# Patient Record
Sex: FEMALE | Race: WHITE
[De-identification: names, ages, dates, MRNs, and addresses within clinical notes are randomized per-mention and may not be internally consistent; named-entity substitution may affect disease eponyms.]

---

## 2020-08-25 ENCOUNTER — HOSPITAL ENCOUNTER (OUTPATIENT)
Dept: HOSPITAL 8 - CFH | Age: 75
Discharge: HOME | End: 2020-08-25
Attending: INTERNAL MEDICINE
Payer: MEDICARE

## 2020-08-25 DIAGNOSIS — E78.5: ICD-10-CM

## 2020-08-25 DIAGNOSIS — I73.9: ICD-10-CM

## 2020-08-25 DIAGNOSIS — I10: ICD-10-CM

## 2020-08-25 DIAGNOSIS — I21.29: ICD-10-CM

## 2020-08-25 DIAGNOSIS — I25.89: Primary | ICD-10-CM

## 2020-08-25 PROCEDURE — A9502 TC99M TETROFOSMIN: HCPCS

## 2020-08-25 PROCEDURE — 78452 HT MUSCLE IMAGE SPECT MULT: CPT

## 2020-08-25 PROCEDURE — 93017 CV STRESS TEST TRACING ONLY: CPT

## 2020-11-30 ENCOUNTER — HOSPITAL ENCOUNTER (OUTPATIENT)
Dept: HOSPITAL 8 - CACL | Age: 75
Discharge: HOME | End: 2020-11-30
Attending: INTERNAL MEDICINE
Payer: MEDICARE

## 2020-11-30 VITALS — SYSTOLIC BLOOD PRESSURE: 204 MMHG | DIASTOLIC BLOOD PRESSURE: 83 MMHG

## 2020-11-30 VITALS — BODY MASS INDEX: 23.79 KG/M2 | HEIGHT: 64 IN | WEIGHT: 139.33 LBS

## 2020-11-30 DIAGNOSIS — R94.39: Primary | ICD-10-CM

## 2020-11-30 DIAGNOSIS — Z95.5: ICD-10-CM

## 2020-11-30 DIAGNOSIS — I73.9: ICD-10-CM

## 2020-11-30 DIAGNOSIS — E66.9: ICD-10-CM

## 2020-11-30 DIAGNOSIS — Z82.49: ICD-10-CM

## 2020-11-30 DIAGNOSIS — Z79.890: ICD-10-CM

## 2020-11-30 DIAGNOSIS — I49.3: ICD-10-CM

## 2020-11-30 DIAGNOSIS — G47.33: ICD-10-CM

## 2020-11-30 DIAGNOSIS — E03.9: ICD-10-CM

## 2020-11-30 DIAGNOSIS — I25.84: ICD-10-CM

## 2020-11-30 DIAGNOSIS — E78.5: ICD-10-CM

## 2020-11-30 DIAGNOSIS — I25.118: ICD-10-CM

## 2020-11-30 DIAGNOSIS — I34.0: ICD-10-CM

## 2020-11-30 DIAGNOSIS — Z88.7: ICD-10-CM

## 2020-11-30 DIAGNOSIS — Z79.899: ICD-10-CM

## 2020-11-30 DIAGNOSIS — I10: ICD-10-CM

## 2020-11-30 DIAGNOSIS — I25.83: ICD-10-CM

## 2020-11-30 DIAGNOSIS — K21.9: ICD-10-CM

## 2020-11-30 DIAGNOSIS — Z87.891: ICD-10-CM

## 2020-11-30 LAB
ANION GAP SERPL CALC-SCNC: 8 MMOL/L (ref 5–15)
BASOPHILS # BLD AUTO: 0 X10^3/UL (ref 0–0.1)
BASOPHILS NFR BLD AUTO: 1 % (ref 0–1)
CALCIUM SERPL-MCNC: 9.2 MG/DL (ref 8.5–10.1)
CHLORIDE SERPL-SCNC: 104 MMOL/L (ref 98–107)
CREAT SERPL-MCNC: 0.82 MG/DL (ref 0.55–1.02)
EOSINOPHIL # BLD AUTO: 0.1 X10^3/UL (ref 0–0.4)
EOSINOPHIL NFR BLD AUTO: 2 % (ref 1–7)
ERYTHROCYTE [DISTWIDTH] IN BLOOD BY AUTOMATED COUNT: 13.9 % (ref 9.6–15.2)
LYMPHOCYTES # BLD AUTO: 1 X10^3/UL (ref 1–3.4)
LYMPHOCYTES NFR BLD AUTO: 23 % (ref 22–44)
MCH RBC QN AUTO: 28.6 PG (ref 27–34.8)
MCHC RBC AUTO-ENTMCNC: 33.4 G/DL (ref 32.4–35.8)
MD: NO
MONOCYTES # BLD AUTO: 0.5 X10^3/UL (ref 0.2–0.8)
MONOCYTES NFR BLD AUTO: 11 % (ref 2–9)
NEUTROPHILS # BLD AUTO: 2.8 X10^3/UL (ref 1.8–6.8)
NEUTROPHILS NFR BLD AUTO: 63 % (ref 42–75)
PLATELET # BLD AUTO: 215 X10^3/UL (ref 130–400)
PMV BLD AUTO: 8.7 FL (ref 7.4–10.4)
RBC # BLD AUTO: 4.35 X10^6/UL (ref 3.82–5.3)

## 2020-11-30 PROCEDURE — 85025 COMPLETE CBC W/AUTO DIFF WBC: CPT

## 2020-11-30 PROCEDURE — 99157 MOD SED OTHER PHYS/QHP EA: CPT

## 2020-11-30 PROCEDURE — 80048 BASIC METABOLIC PNL TOTAL CA: CPT

## 2020-11-30 PROCEDURE — 36415 COLL VENOUS BLD VENIPUNCTURE: CPT

## 2020-11-30 PROCEDURE — 75630 X-RAY AORTA LEG ARTERIES: CPT

## 2020-11-30 PROCEDURE — C1760 CLOSURE DEV, VASC: HCPCS

## 2020-11-30 PROCEDURE — 99156 MOD SED OTH PHYS/QHP 5/>YRS: CPT

## 2020-11-30 PROCEDURE — 75625 CONTRAST EXAM ABDOMINL AORTA: CPT

## 2020-11-30 PROCEDURE — 93458 L HRT ARTERY/VENTRICLE ANGIO: CPT

## 2020-11-30 PROCEDURE — C1894 INTRO/SHEATH, NON-LASER: HCPCS

## 2020-11-30 PROCEDURE — C1769 GUIDE WIRE: HCPCS

## 2021-01-14 DIAGNOSIS — Z23 NEED FOR VACCINATION: ICD-10-CM

## 2022-03-20 ENCOUNTER — OFFICE VISIT (OUTPATIENT)
Dept: URGENT CARE | Facility: PHYSICIAN GROUP | Age: 77
End: 2022-03-20
Payer: MEDICARE

## 2022-03-20 ENCOUNTER — HOSPITAL ENCOUNTER (OUTPATIENT)
Dept: RADIOLOGY | Facility: MEDICAL CENTER | Age: 77
End: 2022-03-20
Attending: PHYSICIAN ASSISTANT
Payer: MEDICARE

## 2022-03-20 VITALS
SYSTOLIC BLOOD PRESSURE: 110 MMHG | OXYGEN SATURATION: 95 % | RESPIRATION RATE: 16 BRPM | BODY MASS INDEX: 26.29 KG/M2 | TEMPERATURE: 98.7 F | HEIGHT: 64 IN | DIASTOLIC BLOOD PRESSURE: 78 MMHG | WEIGHT: 154 LBS | HEART RATE: 65 BPM

## 2022-03-20 DIAGNOSIS — S39.012A ACUTE MYOFASCIAL STRAIN OF LUMBAR REGION, INITIAL ENCOUNTER: ICD-10-CM

## 2022-03-20 DIAGNOSIS — M54.50 ACUTE BILATERAL LOW BACK PAIN WITHOUT SCIATICA: ICD-10-CM

## 2022-03-20 PROCEDURE — 99204 OFFICE O/P NEW MOD 45 MIN: CPT | Performed by: PHYSICIAN ASSISTANT

## 2022-03-20 PROCEDURE — 72100 X-RAY EXAM L-S SPINE 2/3 VWS: CPT

## 2022-03-20 ASSESSMENT — PAIN SCALES - GENERAL: PAINLEVEL: 6=MODERATE PAIN

## 2022-03-20 NOTE — PROGRESS NOTES
"Subjective:   Debbie Joseph is a 76 y.o. female who presents for Fall (Fell back, hit left side of head, lower back pain. )      HPI  Patient is a 76-year-old female who presents to clinic with complaints of lower back pain onset 3 days ago.  She states she accidentally misplaced a step when she was stepping backwards and fell to her lower back and her back of her head struck the side of the house.  She did not hit her head very hard.  It was caught on videotape and she denies any loss of consciousness.  Denies any lingering headache, nausea, vomiting, numbness, tingling.  She complains of bilateral lower back pain worse with movements and turning over in bed.  Denies any numbness, tingling down her legs.  Denies any radiation pain down her legs.  Denies any saddle anesthesia, loss of bowel or bladder control.        Medications:    • atenolol Tabs  • hydroCHLOROthiazide Tabs  • levothyroxine Tabs  • losartan Tabs  • Risedronate Sodium Tabs  • simvastatin Tabs    Allergies: Tetanus antitoxin    Problem List: Debbie Joseph does not have a problem list on file.    Surgical History:  No past surgical history on file.    Past Social Hx: Debbie Joseph  reports that she has never smoked. She does not have any smokeless tobacco history on file. She reports that she does not drink alcohol.     Past Family Hx:  Debbie Joseph family history is not on file.     Problem list, medications, and allergies reviewed by myself today in Epic.     Objective:     /78 (BP Location: Right arm, Patient Position: Sitting, BP Cuff Size: Adult)   Pulse 65   Temp 37.1 °C (98.7 °F) (Temporal)   Resp 16   Ht 1.626 m (5' 4\")   Wt 69.9 kg (154 lb)   SpO2 95%   BMI 26.43 kg/m²     Physical Exam  Vitals reviewed.   Constitutional:       General: She is not in acute distress.     Appearance: Normal appearance. She is not ill-appearing or toxic-appearing.   HENT:      Head: Normocephalic and atraumatic.      Right Ear: Tympanic membrane normal.      Left " Ear: Tympanic membrane normal.      Mouth/Throat:      Mouth: Mucous membranes are moist.      Pharynx: Oropharynx is clear.   Eyes:      Extraocular Movements: Extraocular movements intact.      Conjunctiva/sclera: Conjunctivae normal.      Pupils: Pupils are equal, round, and reactive to light.   Cardiovascular:      Rate and Rhythm: Normal rate and regular rhythm.      Heart sounds: Normal heart sounds.   Pulmonary:      Effort: Pulmonary effort is normal. No respiratory distress.      Breath sounds: Normal breath sounds. No wheezing, rhonchi or rales.   Musculoskeletal:      Cervical back: Neck supple.      Comments: Back: Full range of flexion, extension, rotation, lateralization.   Diffuse enderness to palpation lumbar area with mild midline tenderness.   Negative crepitus, deformities, or step-offs.  Negative straight leg raise     Lymphadenopathy:      Cervical: No cervical adenopathy.   Skin:     General: Skin is warm and dry.   Neurological:      General: No focal deficit present.      Mental Status: She is alert and oriented to person, place, and time.      Gait: Gait is intact.      Comments: Strength 5/5 bilateral lower extremities   Psychiatric:         Mood and Affect: Mood normal.         Behavior: Behavior normal.       RADIOLOGY RESULTS   DX-LUMBAR SPINE-2 OR 3 VIEWS    Result Date: 3/20/2022  3/20/2022 2:13 PM HISTORY/REASON FOR EXAM:  Low back pain after ground-level fall TECHNIQUE/ EXAM DESCRIPTION AND NUMBER OF VIEWS:  3 views of the lumbar spine. COMPARISON: None. FINDINGS: Vertebral body height is well maintained.  There is no evidence of fracture. Vertebral alignment is normal. There is moderate degenerative disc disease and facet arthropathy. There is diffuse osteopenia.     No compression deformity or acute fracture is identified. THERE IS DEGENERATIVE DISC DISEASE AND FACET ARTHROPATHY.         Diagnosis and associated orders:     1. Acute myofascial strain of lumbar region, initial  encounter  - DX-LUMBAR SPINE-2 OR 3 VIEWS; Future  - diclofenac sodium (VOLTAREN) 1 % Gel; Apply 2 grams to affected area no more than four times per day  Dispense: 100 g; Refill: 0       Comments/MDM:     • X-ray results per radiologist interpretation above. I personally reviewed images and radiologist report which showed no compression deformity or acute fracture identified.   Discussed with patient signs and symptoms consistent with a back strain and contusion.   Treatment of initial rest with no heavy lifting, stooping, or strenuous activity. Massage, ice and/or heat which ever feels better, and Ibuprofen per manufacture's instructions. Encouraged walking, stretching, and range of motion exercises as tolerated. Avoid sitting or laying down for long periods of time except for at night during sleep.   Patient is overall very well-appearing, no acute distress, and normal vital signs. Suspicions for acute emergent pathology are low.   Follow up with your PCP or return to urgent care if symptoms not improving in 1-2 weeks.        I personally reviewed prior external notes and test results pertinent to today's visit. Red flags discussed and indications to present to the Emergency Department. Supportive care, natural history, differential diagnoses, and indications for immediate follow-up discussed. Patient expresses understanding and agrees to plan. Patient denies any other questions or concerns.     Follow-up with the primary care physician for recheck, reevaluation, and consideration of further management.    Please note that this dictation was created using voice recognition software. I have made a reasonable attempt to correct obvious errors, but I expect that there are errors of grammar and possibly content that I did not discover before finalizing the note.    This note was electronically signed by Arvind Fallon PA-C

## 2022-11-03 ENCOUNTER — PATIENT MESSAGE (OUTPATIENT)
Dept: HEALTH INFORMATION MANAGEMENT | Facility: OTHER | Age: 77
End: 2022-11-03

## 2023-11-29 ENCOUNTER — PATIENT MESSAGE (OUTPATIENT)
Dept: HEALTH INFORMATION MANAGEMENT | Facility: OTHER | Age: 78
End: 2023-11-29

## 2024-12-19 ENCOUNTER — HOSPITAL ENCOUNTER (EMERGENCY)
Facility: MEDICAL CENTER | Age: 79
End: 2024-12-19
Attending: EMERGENCY MEDICINE
Payer: MEDICARE

## 2024-12-19 ENCOUNTER — APPOINTMENT (OUTPATIENT)
Dept: RADIOLOGY | Facility: MEDICAL CENTER | Age: 79
End: 2024-12-19
Attending: EMERGENCY MEDICINE
Payer: MEDICARE

## 2024-12-19 VITALS
TEMPERATURE: 97.3 F | WEIGHT: 167 LBS | OXYGEN SATURATION: 95 % | RESPIRATION RATE: 17 BRPM | BODY MASS INDEX: 28.51 KG/M2 | HEART RATE: 75 BPM | SYSTOLIC BLOOD PRESSURE: 185 MMHG | HEIGHT: 64 IN | DIASTOLIC BLOOD PRESSURE: 78 MMHG

## 2024-12-19 VITALS
RESPIRATION RATE: 18 BRPM | WEIGHT: 167 LBS | SYSTOLIC BLOOD PRESSURE: 153 MMHG | BODY MASS INDEX: 28.51 KG/M2 | DIASTOLIC BLOOD PRESSURE: 92 MMHG | HEART RATE: 82 BPM | OXYGEN SATURATION: 88 % | TEMPERATURE: 98.6 F | HEIGHT: 64 IN

## 2024-12-19 DIAGNOSIS — S52.92XA CLOSED FRACTURE OF LEFT FOREARM, INITIAL ENCOUNTER: ICD-10-CM

## 2024-12-19 DIAGNOSIS — S52.502A CLOSED FRACTURE OF DISTAL END OF LEFT RADIUS, UNSPECIFIED FRACTURE MORPHOLOGY, INITIAL ENCOUNTER: ICD-10-CM

## 2024-12-19 DIAGNOSIS — R53.1 WEAKNESS: ICD-10-CM
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
## 2024-12-19 LAB
ALBUMIN SERPL BCP-MCNC: 4.1 G/DL (ref 3.2–4.9)
ALBUMIN/GLOB SERPL: 1.5 G/DL
ALP SERPL-CCNC: 95 U/L (ref 30–99)
ALT SERPL-CCNC: 18 U/L (ref 2–50)
ANION GAP SERPL CALC-SCNC: 9 MMOL/L (ref 7–16)
APPEARANCE UR: CLEAR
AST SERPL-CCNC: 32 U/L (ref 12–45)
BACTERIA #/AREA URNS HPF: NORMAL /HPF
BASOPHILS # BLD AUTO: 0.4 % (ref 0–1.8)
BASOPHILS # BLD: 0.04 K/UL (ref 0–0.12)
BILIRUB SERPL-MCNC: 0.4 MG/DL (ref 0.1–1.5)
BILIRUB UR QL STRIP.AUTO: NEGATIVE
BUN SERPL-MCNC: 30 MG/DL (ref 8–22)
CALCIUM ALBUM COR SERPL-MCNC: 9.3 MG/DL (ref 8.5–10.5)
CALCIUM SERPL-MCNC: 9.4 MG/DL (ref 8.5–10.5)
CASTS URNS QL MICRO: NORMAL /LPF (ref 0–2)
CHLORIDE SERPL-SCNC: 104 MMOL/L (ref 96–112)
CO2 SERPL-SCNC: 23 MMOL/L (ref 20–33)
COLOR UR: YELLOW
CREAT SERPL-MCNC: 0.98 MG/DL (ref 0.5–1.4)
EOSINOPHIL # BLD AUTO: 0.13 K/UL (ref 0–0.51)
EOSINOPHIL NFR BLD: 1.2 % (ref 0–6.9)
EPITHELIAL CELLS 1715: NORMAL /HPF (ref 0–5)
ERYTHROCYTE [DISTWIDTH] IN BLOOD BY AUTOMATED COUNT: 44.2 FL (ref 35.9–50)
GFR SERPLBLD CREATININE-BSD FMLA CKD-EPI: 58 ML/MIN/1.73 M 2
GLOBULIN SER CALC-MCNC: 2.8 G/DL (ref 1.9–3.5)
GLUCOSE SERPL-MCNC: 110 MG/DL (ref 65–99)
GLUCOSE UR STRIP.AUTO-MCNC: NEGATIVE MG/DL
HCT VFR BLD AUTO: 39.6 % (ref 37–47)
HGB BLD-MCNC: 13.4 G/DL (ref 12–16)
HOLDING TUBE BB 8507: NORMAL
IMM GRANULOCYTES # BLD AUTO: 0.09 K/UL (ref 0–0.11)
IMM GRANULOCYTES NFR BLD AUTO: 0.8 % (ref 0–0.9)
KETONES UR STRIP.AUTO-MCNC: NEGATIVE MG/DL
LEUKOCYTE ESTERASE UR QL STRIP.AUTO: NEGATIVE
LYMPHOCYTES # BLD AUTO: 1.08 K/UL (ref 1–4.8)
LYMPHOCYTES NFR BLD: 9.8 % (ref 22–41)
MCH RBC QN AUTO: 29.5 PG (ref 27–33)
MCHC RBC AUTO-ENTMCNC: 33.8 G/DL (ref 32.2–35.5)
MCV RBC AUTO: 87.2 FL (ref 81.4–97.8)
MICRO URNS: NORMAL
MONOCYTES # BLD AUTO: 0.78 K/UL (ref 0–0.85)
MONOCYTES NFR BLD AUTO: 7.1 % (ref 0–13.4)
NEUTROPHILS # BLD AUTO: 8.85 K/UL (ref 1.82–7.42)
NEUTROPHILS NFR BLD: 80.7 % (ref 44–72)
NITRITE UR QL STRIP.AUTO: NEGATIVE
NRBC # BLD AUTO: 0 K/UL
NRBC BLD-RTO: 0 /100 WBC (ref 0–0.2)
PH UR STRIP.AUTO: 5.5 [PH] (ref 5–8)
PLATELET # BLD AUTO: 222 K/UL (ref 164–446)
PMV BLD AUTO: 10.9 FL (ref 9–12.9)
POTASSIUM SERPL-SCNC: 5 MMOL/L (ref 3.6–5.5)
PROT SERPL-MCNC: 6.9 G/DL (ref 6–8.2)
PROT UR QL STRIP: NEGATIVE MG/DL
RBC # BLD AUTO: 4.54 M/UL (ref 4.2–5.4)
RBC # URNS HPF: NORMAL /HPF (ref 0–2)
RBC UR QL AUTO: NEGATIVE
SODIUM SERPL-SCNC: 136 MMOL/L (ref 135–145)
SP GR UR STRIP.AUTO: 1.01
UROBILINOGEN UR STRIP.AUTO-MCNC: 0.2 EU/DL
WBC # BLD AUTO: 11 K/UL (ref 4.8–10.8)
WBC #/AREA URNS HPF: NORMAL /HPF

## 2024-12-19 PROCEDURE — 99284 EMERGENCY DEPT VISIT MOD MDM: CPT

## 2024-12-19 PROCEDURE — 80053 COMPREHEN METABOLIC PANEL: CPT

## 2024-12-19 PROCEDURE — 72128 CT CHEST SPINE W/O DYE: CPT

## 2024-12-19 PROCEDURE — 97535 SELF CARE MNGMENT TRAINING: CPT

## 2024-12-19 PROCEDURE — 99284 EMERGENCY DEPT VISIT MOD MDM: CPT | Mod: 27

## 2024-12-19 PROCEDURE — 36415 COLL VENOUS BLD VENIPUNCTURE: CPT

## 2024-12-19 PROCEDURE — 72192 CT PELVIS W/O DYE: CPT

## 2024-12-19 PROCEDURE — 70486 CT MAXILLOFACIAL W/O DYE: CPT

## 2024-12-19 PROCEDURE — 29125 APPL SHORT ARM SPLINT STATIC: CPT

## 2024-12-19 PROCEDURE — 97163 PT EVAL HIGH COMPLEX 45 MIN: CPT

## 2024-12-19 PROCEDURE — 70450 CT HEAD/BRAIN W/O DYE: CPT

## 2024-12-19 PROCEDURE — 302874 HCHG BANDAGE ACE 2 OR 3""

## 2024-12-19 PROCEDURE — 72131 CT LUMBAR SPINE W/O DYE: CPT

## 2024-12-19 PROCEDURE — 85025 COMPLETE CBC W/AUTO DIFF WBC: CPT

## 2024-12-19 PROCEDURE — 81001 URINALYSIS AUTO W/SCOPE: CPT

## 2024-12-19 PROCEDURE — 72125 CT NECK SPINE W/O DYE: CPT

## 2024-12-19 PROCEDURE — 73110 X-RAY EXAM OF WRIST: CPT | Mod: LT

## 2024-12-19 ASSESSMENT — COGNITIVE AND FUNCTIONAL STATUS - GENERAL
MOVING FROM LYING ON BACK TO SITTING ON SIDE OF FLAT BED: A LITTLE
STANDING UP FROM CHAIR USING ARMS: A LITTLE
MOBILITY SCORE: 18
TURNING FROM BACK TO SIDE WHILE IN FLAT BAD: A LITTLE
CLIMB 3 TO 5 STEPS WITH RAILING: A LITTLE
SUGGESTED CMS G CODE MODIFIER MOBILITY: CK
WALKING IN HOSPITAL ROOM: A LITTLE
MOVING TO AND FROM BED TO CHAIR: A LITTLE

## 2024-12-19 ASSESSMENT — FIBROSIS 4 INDEX: FIB4 SCORE: 2.75

## 2024-12-19 ASSESSMENT — GAIT ASSESSMENTS
GAIT LEVEL OF ASSIST: STANDBY ASSIST
DEVIATION: INCREASED BASE OF SUPPORT;BRADYKINETIC
DISTANCE (FEET): 50

## 2024-12-19 ASSESSMENT — PAIN DESCRIPTION - PAIN TYPE: TYPE: ACUTE PAIN

## 2024-12-19 NOTE — ED TRIAGE NOTES
Debbie Joseph   79 y.o.     Chief Complaint   Patient presents with    Weakness     Pt JAYSHREE SMYTH from home for new onset of weakness. Pt states bilateral leg weakness.  Pt had a GLF this morning and was seen in the ED. -head CT, pt has a broken wrist. Pt realized when she got home after discharge she felt weak and didn't feel like she could care for herself. Pt lives alone and was struggling to get off the toilet because of the weakness. Denies dizziness or LOC.     Pt is alert, oriented, and follows commands. Pt speaking in full sentences and responds appropriately to questions. No acute distress noted. Respirations are even and unlabored.     Vitals:    12/19/24 1401   BP: (!) 141/65   Pulse: 74   Resp: 16   Temp: 37 °C (98.6 °F)   SpO2: 95%

## 2024-12-19 NOTE — ED PROVIDER NOTES
ED Provider Note    CHIEF COMPLAINT  Chief Complaint   Patient presents with    Weakness     Pt JAYSHREE REM from home for new onset of weakness. Pt states bilateral leg weakness.  Pt had a GLF this morning and was seen in the ED. -head CT, pt has a broken wrist. Pt realized when she got home after discharge she felt weak and didn't feel like she could care for herself. Pt lives alone and was struggling to get off the toilet because of the weakness. Denies dizziness or LOC.       EXTERNAL RECORDS REVIEWED  Recent note from cardiology, patient followed for heart failure, with history of peripheral vascular disease.  Patient with preserved ejection fraction heart failure.  Patient also with ER visit here earlier today, see below for further details regarding this    HPI/ROS      Debbie Joseph is a 79 y.o. female who presents with chief complaint of weakness.  Patient was seen earlier today after mechanical fall.  Patient was trying to get into a moving car and fell landing on her left hand, and striking her head.  Patient was evaluated here with CT head, CT C-spine, CT max face, x-ray of her wrist, CT of her spine and pelvis and basic labs.  Patient's extensive workup revealed some minimal leukocytosis, a chemistry that was unremarkable, a CT head, CT spine, CT pelvis, CT max face all of which failed to reveal any evidence of acute actionable trauma, and an x-ray of her left wrist which revealed left distal radius fracture.  Patient was sent home following this reassuring workup but upon returning home reports that she felt like she was not going to be able to care for herself as she lives alone.  She felt weak trying to get up and out of her chair.  Patient denies any associated numbness.  Patient denies any associated headache.    PAST MEDICAL HISTORY   has a past medical history of Osteoporosis, senile.    SURGICAL HISTORY  patient denies any surgical history    FAMILY HISTORY  History reviewed. No pertinent family  "history.    SOCIAL HISTORY  Social History     Tobacco Use    Smoking status: Never    Smokeless tobacco: Not on file   Vaping Use    Vaping status: Never Used   Substance and Sexual Activity    Alcohol use: No    Drug use: Never    Sexual activity: Not on file       CURRENT MEDICATIONS  Home Medications       Reviewed by Michelle Bloom R.N. (Registered Nurse) on 12/19/24 at 1402  Med List Status: Partial     Medication Last Dose Status   atenolol (TENORMIN) 50 MG TABS  Active   diclofenac sodium (VOLTAREN) 1 % Gel  Active   hydrochlorothiazide (HYDRODIURIL) 25 MG TABS  Active   levothyroxine (SYNTHROID) 125 MCG TABS  Active   losartan (COZAAR) 100 MG TABS  Active   Risedronate Sodium (ACTONEL) 150 MG TABS  Active   simvastatin (ZOCOR) 20 MG TABS  Active                    ALLERGIES  Allergies   Allergen Reactions    Tetanus Antitoxin        PHYSICAL EXAM  VITAL SIGNS: BP (!) 141/65   Pulse 74   Temp 37 °C (98.6 °F) (Temporal)   Resp 16   Ht 1.626 m (5' 4\")   Wt 75.8 kg (167 lb)   SpO2 95%   BMI 28.67 kg/m²    Physical Exam  Constitutional:       Appearance: Normal appearance.   HENT:      Head:      Comments: Some ecchymosis patient's maxilla, no large cephalhematoma.  No skull abnormality.     Mouth/Throat:      Mouth: Mucous membranes are moist.   Cardiovascular:      Rate and Rhythm: Normal rate and regular rhythm.      Heart sounds: No murmur heard.  Pulmonary:      Effort: Pulmonary effort is normal.   Abdominal:      General: Abdomen is flat. There is no distension.      Palpations: There is no mass.      Tenderness: There is no abdominal tenderness.   Musculoskeletal:      Comments: Shoulders, elbows with full range of motion and no pain evoked.  Bilateral hips, knees, ankles without any pain evoked.  5 out of 5 strength in distal and proximal musculature.  No associated clonus.  Sensation intact throughout.  Cervical, thoracic, lumbar spine without any tenderness to palpation.  Distal pulses are 2+ " in bilateral lower extremities.  Sensation intact throughout.   Neurological:      General: No focal deficit present.      Mental Status: She is alert and oriented to person, place, and time.   Psychiatric:         Mood and Affect: Mood normal.           EKG/LABS  Results for orders placed or performed during the hospital encounter of 12/19/24   CBC WITH DIFFERENTIAL    Collection Time: 12/19/24  8:24 AM   Result Value Ref Range    WBC 11.0 (H) 4.8 - 10.8 K/uL    RBC 4.54 4.20 - 5.40 M/uL    Hemoglobin 13.4 12.0 - 16.0 g/dL    Hematocrit 39.6 37.0 - 47.0 %    MCV 87.2 81.4 - 97.8 fL    MCH 29.5 27.0 - 33.0 pg    MCHC 33.8 32.2 - 35.5 g/dL    RDW 44.2 35.9 - 50.0 fL    Platelet Count 222 164 - 446 K/uL    MPV 10.9 9.0 - 12.9 fL    Neutrophils-Polys 80.70 (H) 44.00 - 72.00 %    Lymphocytes 9.80 (L) 22.00 - 41.00 %    Monocytes 7.10 0.00 - 13.40 %    Eosinophils 1.20 0.00 - 6.90 %    Basophils 0.40 0.00 - 1.80 %    Immature Granulocytes 0.80 0.00 - 0.90 %    Nucleated RBC 0.00 0.00 - 0.20 /100 WBC    Neutrophils (Absolute) 8.85 (H) 1.82 - 7.42 K/uL    Lymphs (Absolute) 1.08 1.00 - 4.80 K/uL    Monos (Absolute) 0.78 0.00 - 0.85 K/uL    Eos (Absolute) 0.13 0.00 - 0.51 K/uL    Baso (Absolute) 0.04 0.00 - 0.12 K/uL    Immature Granulocytes (abs) 0.09 0.00 - 0.11 K/uL    NRBC (Absolute) 0.00 K/uL   CMP    Collection Time: 12/19/24  8:24 AM   Result Value Ref Range    Sodium 136 135 - 145 mmol/L    Potassium 5.0 3.6 - 5.5 mmol/L    Chloride 104 96 - 112 mmol/L    Co2 23 20 - 33 mmol/L    Anion Gap 9.0 7.0 - 16.0    Glucose 110 (H) 65 - 99 mg/dL    Bun 30 (H) 8 - 22 mg/dL    Creatinine 0.98 0.50 - 1.40 mg/dL    Calcium 9.4 8.5 - 10.5 mg/dL    Correct Calcium 9.3 8.5 - 10.5 mg/dL    AST(SGOT) 32 12 - 45 U/L    ALT(SGPT) 18 2 - 50 U/L    Alkaline Phosphatase 95 30 - 99 U/L    Total Bilirubin 0.4 0.1 - 1.5 mg/dL    Albumin 4.1 3.2 - 4.9 g/dL    Total Protein 6.9 6.0 - 8.2 g/dL    Globulin 2.8 1.9 - 3.5 g/dL    A-G Ratio 1.5 g/dL    HOLD BLOOD BANK SPECIMEN (NOT TESTED)    Collection Time: 12/19/24  8:24 AM   Result Value Ref Range    Holding Tube - Bb DONE    ESTIMATED GFR    Collection Time: 12/19/24  8:24 AM   Result Value Ref Range    GFR (CKD-EPI) 58 (A) >60 mL/min/1.73 m 2       I have independently interpreted this EKG          COURSE & MEDICAL DECISION MAKING    ASSESSMENT, COURSE AND PLAN  Care Narrative: Well-appearing patient here with extensive workup for recent trauma.  This does not appear to be consistent with focal neurologic changes to suggest delayed bleed.  She has a very reassuring exam otherwise.  She is complaining of weakness but no focal findings on exam.  Patient without any associated back pain.  Patient is anticoagulated but given her lack of any focal findings on exam my suspicion of epidural hematoma as a cause of symptoms is very low here.  No clonus.  PT orders placed, will discuss with social work.  Social work will review the case with physical therapy.  I have placed home health orders for physical therapy in case this is the disposition that is most appropriate for patient.  Patient signed out to my partner awaiting evaluation from physical therapy.  Patient was seen by physical therapy.  They have confirmed patient is able to ambulate without personal assistance, they have given patient some strategies on how to get up without using her affected broken wrist.  Patient is safe for discharge home.  Home health orders were placed.  Case management helping coordinate patient's return home and home health orders.              DISPOSITION AND DISCUSSIONS      Escalation of care considered, and ultimately not performed: Repeat serology labs were deferred given patient with these checked within the last few hours.  Repeat imaging also deferred, see above for reasoning    Barriers to care at this time, including but not limited to: Patient lacks transportation .       FINAL DIAGNOSIS  1. Weakness        2. Closed  fracture of distal end of left radius, unspecified fracture morphology, initial encounter

## 2024-12-19 NOTE — ED NOTES
Bedside report received from trauma RN Ely, assumed care of patient.  POC discussed with patient. Call light within reach, all needs addressed at this time.       Fall risk interventions in place: Place fall risk sign on patient's door, Keep floor surfaces clean and dry, and Accompanied to restroom (all applicable per [place] Fall risk assessment)   Continuous monitoring: Cardiac Leads, Pulse Ox, or Blood Pressure  IVF/IV medications: Not Applicable   Oxygen: Room Air  Bedside sitter: Not Applicable   Isolation: Not Applicable    Additional blankets provided. RN attempted to draw from piv, draw unsuccessful, however IV flushes, phlebotomy messaged for draw.

## 2024-12-19 NOTE — ED NOTES
RN rounded. Pt resting with even chest rise and fall, reports no needs at this time, call light available and in reach.

## 2024-12-19 NOTE — ED NOTES
Tech at bedside for splint. Pt resting with even chest rise and fall, reports no needs at this time, call light available and in reach.

## 2024-12-19 NOTE — ED NOTES
PT ambulated with steady gait to bathroom and back accompanied by RN Pt resting with even chest rise and fall, reports no needs at this time, call light available and in reach. ERP notifed.

## 2024-12-19 NOTE — DISCHARGE INSTRUCTIONS
Follow-up closely with your primary care provider.  Rehab will have home health, with physical therapy and a home health aide come to her house to help you.  Follow the instructions that were given to you by your physical therapist today.  Make sure you are eating and drinking plenty.
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
## 2024-12-19 NOTE — DISCHARGE INSTRUCTIONS
1.  Please follow-up with your primary care clinic, call today to schedule a follow-up appointment for complete recheck.  As we discussed, there are some changes seen on the CT of the spine that are likely chronic, please have your CT imaging reviewed by your primary care clinic to determine if any additional follow-up is needed.    2.  Please call the orthopedic clinic listed above to schedule a follow-up appointment for treatment of the wrist fracture.  Please leave the splint in place until you follow-up with orthopedics.  If you call today and are not able to schedule an appointment within 1 week, please call the emergency department for assistance.    3.  Return to the emergency department if you develop any new or worsening symptoms including pain, headache, nausea or vomiting, or if you have any further concerns.

## 2024-12-19 NOTE — ED NOTES
Break RN: DC home with written and verbal instructions regarding f/u, activity and RX.  Verbalized understanding, pt assisted to front lobby via wheelchair.
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
## 2024-12-19 NOTE — ED NOTES
Patient wheeled out to lobby accompanied by RN. Triage tech to standby transfer pt into family members car.

## 2024-12-20 ENCOUNTER — HOME HEALTH ADMISSION (OUTPATIENT)
Dept: HOME HEALTH SERVICES | Facility: HOME HEALTHCARE | Age: 79
End: 2024-12-20
Payer: MEDICARE

## 2024-12-20 NOTE — THERAPY
Physical Therapy   Initial Evaluation     Patient Name: Debbie Joseph  Age:  79 y.o., Sex:  female  Medical Record #: [id number]  Today's Date: 12/19/2024     Precautions  Precautions: Non Weight Bearing Left Upper Extremity    Assessment  Pt is an 79 year old female who presented this morning after a fall with injury to her wrist. Imaging revealed a comminuted slightly impacted fracture of the left distal radius with intra-articular extension and dorsal angulation and an ulnar styloid process fracture. Pt splinted. Pt DC this morning, but returned this afternoon after feeling like she was not doing well at home. Brother present throughout eval. Pt was able to ambulate with no LOB and no AD. Pt and brother educated on head hips relationship, strategies to improve efficiency with movements, strategies to increase safety with transfers, and home safety tips. Recommend home health services to address higher level deficits.        Plan    Physical Therapy Initial Treatment Plan   Duration: Evaluation only    DC Equipment Recommendations: None (possible commode over toilet)  Discharge Recommendations: Recommend home health for continued physical therapy services          Objective     12/19/24 1531   Initial Contact Note    Initial Contact Note Order Received and Verified, Evaluation Only - Patient Does Not Require Further Acute Physical Therapy at this Time.  However, May Benefit from Post Acute Therapy for Higher Level Functional Deficits.   Precautions   Precautions Non Weight Bearing Left Upper Extremity   Vitals   O2 Delivery Device None - Room Air   Prior Living Situation   Lives with - Patient's Self Care Capacity Alone and Able to Care For Self   Comments Pt has a brother that lives in [place] that is able to come and help as needed.   Prior Level of Functional Mobility   Bed Mobility Independent   Transfer Status Independent   Ambulation Independent   Ambulation Distance household   Assistive Devices Used None    Stairs Independent   History of Falls   History of Falls Yes   Date of Last Fall 12/19/24   Cognition    Level of Consciousness Alert   Passive ROM Lower Body   Passive ROM Lower Body WDL   Active ROM Lower Body    Active ROM Lower Body  WDL   Strength Lower Body   Lower Body Strength  X   Gross Strength Generalized Weakness, Equal Bilaterally   Comments but able to functionally complete mobility tasks   Other Treatments   Other Treatments Provided Pt and brother educated on head hips relationship, strategies to improve efficiency with movements, strategies to increase safety with transfers, and home safety tips   Balance Assessment   Sitting Balance (Static) Good   Sitting Balance (Dynamic) Fair +   Standing Balance (Static) Fair   Standing Balance (Dynamic) Fair   Weight Shift Sitting Fair   Weight Shift Standing Fair   Comments no AD   Bed Mobility    Supine to Sit Minimal Assist  (due to REUBEN cmdonald- pt stated no issues at home today)   Gait Analysis   Gait Level Of Assist Standby Assist   Distance (Feet) 50   # of Times Distance was Traveled 2   Deviation Increased Base Of Support;Bradykinetic   Weight Bearing Status NWB L UE   Functional Mobility   Sit to Stand Standby Assist   Bed, Chair, Wheelchair Transfer Standby Assist   Toilet Transfers Standby Assist   Transfer Method Stand Step   Mobility bed mob, gait, toileting, gait, EOB   6 Clicks Assessment - How much HELP from from another person do you currently need... (If the patient hasn't done an activity recently, how much help from another person do you think he/she would need if he/she tried?)   Turning from your back to your side while in a flat bed without using bedrails? 3   Moving from lying on your back to sitting on the side of a flat bed without using bedrails? 3   Moving to and from a bed to a chair (including a wheelchair)? 3   Standing up from a chair using your arms (e.g., wheelchair, or bedside chair)? 3   Walking in hospital room? 3    Climbing 3-5 steps with a railing? 3   6 clicks Mobility Score 18   Activity Tolerance   Standing 15 min   Education Group   Education Provided Role of Physical Therapist  (strategies to increase efficiency of movement)   Role of Physical Therapist Patient Response Patient;Family;Acceptance;Demonstration;Explanation;Verbal Demonstration;Action Demonstration   Physical Therapy Initial Treatment Plan    Duration Evaluation only   Anticipated Discharge Equipment and Recommendations   DC Equipment Recommendations None  (possible commode over toilet)   Discharge Recommendations Recommend home health for continued physical therapy services   Interdisciplinary Plan of Care Collaboration   IDT Collaboration with  Nursing;Physician;   Patient Position at End of Therapy Edge of Bed;Family / Friend in Room   Collaboration Comments PT eval   Session Information   Date / Session Number  12/19- eval only

## 2024-12-20 NOTE — DISCHARGE PLANNING
SW updated by PT that they are recommending HH for this Pt to d/c home.  SW met with Pt bedside to obtain HH choice.  Pt shared that her PCP is Dr. Gayla Sanderson MD ([phone]).  The Pt stated that Gayla is retiring and she is supposed to be set up with another seema ADHIKARI in the office when she leave but it has not happened yet.     HH choice was complete, the Pt chose [facility].  Choice faxed to [facility] for referral follow up.

## 2024-12-20 NOTE — DISCHARGE PLANNING
Received Choice form at [phone]  Agency/Facility Name: [facility]  Referral sent per Choice form @ [phone]

## 2024-12-20 NOTE — DISCHARGE PLANNING
ATTN: Case Management  RE: Referral for Home Health    As of 12/20/2024, we have accepted the Home Health referral for the patient listed above.    [facility]  will contact the patient within 48 hours. If you have any questions or concerns regarding the patient’s transition to Home Health, please do not hesitate to contact us at [phone].      We look forward to collaborating with you,  [facility] Team

## 2024-12-26 ENCOUNTER — HOME CARE VISIT (OUTPATIENT)
Dept: HOME HEALTH SERVICES | Facility: HOME HEALTHCARE | Age: 79
End: 2024-12-26

## 2024-12-29 ENCOUNTER — HOME CARE VISIT (OUTPATIENT)
Dept: HOME HEALTH SERVICES | Facility: HOME HEALTHCARE | Age: 79
End: 2024-12-29
Payer: MEDICARE

## 2024-12-29 VITALS
SYSTOLIC BLOOD PRESSURE: 128 MMHG | HEIGHT: 64 IN | BODY MASS INDEX: 29.35 KG/M2 | HEART RATE: 84 BPM | TEMPERATURE: 97.9 F | OXYGEN SATURATION: 98 % | DIASTOLIC BLOOD PRESSURE: 72 MMHG | WEIGHT: 171.9 LBS

## 2024-12-29 PROCEDURE — 665999 HH PPS REVENUE DEBIT

## 2024-12-29 PROCEDURE — 665005 NO-PAY RAP - HOME HEALTH

## 2024-12-29 PROCEDURE — G0299 HHS/HOSPICE OF RN EA 15 MIN: HCPCS

## 2024-12-29 PROCEDURE — 665001 SOC-HOME HEALTH

## 2024-12-29 PROCEDURE — 665998 HH PPS REVENUE CREDIT

## 2024-12-29 SDOH — ECONOMIC STABILITY: HOUSING INSECURITY
HOME SAFETY: SN EDUCATED PT IN REGARDS TO FALL PREVENTION USING HANDOUT IN WHITE BINDER.     SN ENCOURAGED PT TO OBTAIN A FIRE EXTINGUISHER, IF POSSIBLE, FOR FIRE SAFETY REASONS.

## 2024-12-29 ASSESSMENT — ENCOUNTER SYMPTOMS
PAIN LOCATION - RELIEVING FACTORS: REST, PAIN MEDS
HIGHEST PAIN SEVERITY IN PAST 24 HOURS: 7/10
PAIN LOCATION - PAIN FREQUENCY: FREQUENT
PAIN LOCATION - PAIN QUALITY: ACHY, SHARP
CONSTIPATION: 1
FATIGUE: 1
PAIN: 1
PAIN LOCATION - PAIN FREQUENCY: INFREQUENT
LOWEST PAIN SEVERITY IN PAST 24 HOURS: 0/10
FORGETFULNESS: 1
DESCRIPTION OF MEMORY LOSS: SHORT TERM
STOOL FREQUENCY: LESS THAN DAILY
VOMITING: PT DENIES
SUBJECTIVE PAIN PROGRESSION: GRADUALLY IMPROVING
DYSPNEA ON EXERTION: 1
PAIN LOCATION - PAIN DURATION: CHRONIC
PAIN LOCATION - PAIN QUALITY: ACHY
NAUSEA: PT DENIES
PAIN LOCATION - PAIN SEVERITY: 0/10
PAIN SEVERITY GOAL: 0/10
PAIN LOCATION - RELIEVING FACTORS: REST, PAIN MEDS
FATIGUES EASILY: 1
PAIN LOCATION - PAIN SEVERITY: 0/10
PAIN LOCATION - PAIN DURATION: ACUTE
SHORTNESS OF BREATH: 1
LOWER EXTREMITY EDEMA: 1
DYSPNEA ACTIVITY LEVEL: AFTER AMBULATING MORE THAN 20 FT
LAST BOWEL MOVEMENT: 67202
PAIN LOCATION: LOWER BACK
PAIN LOCATION: L WRIST
DIFFICULTY THINKING: 1

## 2024-12-29 ASSESSMENT — ACTIVITIES OF DAILY LIVING (ADL)
OASIS_M1830: 05
ORAL_CARE_ASSESSED: 1
TRANSPORTATION COMMENTS: PT NEEDS ASSISTANCE TO LEAVE HOME
TOILETING: STAND BY ASSIST
ORAL_CARE_CURRENT_FUNCTION: NEEDS ASSISTANCE
CURRENT_FUNCTION: SUPERVISION
PHYSICAL TRANSFERS ASSESSED: 1
GROOMING_CURRENT_FUNCTION: MINIMUM ASSIST
DRESSING_LB_CURRENT_FUNCTION: MODERATE ASSIST
FEEDING ASSESSED: 1
GROOMING ASSESSED: 1
BATHING ASSESSED: 1
TOILETING: 1
DRESSING_UB_CURRENT_FUNCTION: MINIMUM ASSIST
AMBULATION ASSISTANCE: 1
AMBULATION ASSISTANCE: SUPERVISION
PREPARING MEALS: NEEDS ASSISTANCE
BATHING_CURRENT_FUNCTION: MINIMUM ASSIST
USING THE TELPHONE: INDEPENDENT
TELEPHONE USE ASSESSED: 1
FEEDING: INDEPENDENT

## 2024-12-29 ASSESSMENT — FIBROSIS 4 INDEX: FIB4 SCORE: 2.75

## 2024-12-30 ENCOUNTER — HOME CARE VISIT (OUTPATIENT)
Dept: HOME HEALTH SERVICES | Facility: HOME HEALTHCARE | Age: 79
End: 2024-12-30
Payer: MEDICARE

## 2024-12-30 ENCOUNTER — DOCUMENTATION (OUTPATIENT)
Dept: MEDICAL GROUP | Facility: PHYSICIAN GROUP | Age: 79
End: 2024-12-30
Payer: MEDICARE

## 2024-12-30 PROCEDURE — 665998 HH PPS REVENUE CREDIT

## 2024-12-30 PROCEDURE — 665999 HH PPS REVENUE DEBIT

## 2024-12-30 NOTE — CASE COMMUNICATION
Quality Review Completed for SOC [facility]IS by SHANON Mcintosh RN on 12/30//2024:     Edits completed by SHANON Mcintosh RN:     1.  and  diagnosis coding updated per chart review  2.  changed to 12/29 for LSOC ordered  3.  is NA, the recent acute care was outpatient/ED visits  4.  is yes to PVD/PAD per diagnosis  5.  changed to 3 per guidelines when ambulation is supervised  6. Added exercises prescribed, ambulate only w /assist, adequate emergency plan, proper med use to safety measures, to 485 form and for F2F encounter Ric Quijano M.D. on 12/19/2024   7. Added cane to DME list  8. Added HF to directions tab

## 2024-12-30 NOTE — Clinical Note
I agree with these changes  ----- Message -----  From: Thu Mcintosh R.N.  Sent: 12/30/2024   8:43 AM PST  To: Sheela Valenzuela R.N.      Quality Review Completed for SOC [facility]IS by SHANON Mcintosh, RN on 12/30//2024:     Edits completed by SHANON Mcintosh RN:     1.  and  diagnosis coding updated per chart review  2.  changed to 12/29 for LSOC ordered  3.  is NA, the recent acute care was outpatient/ED visits  4.  is yes to PVD/PAD per diagnosis  5.  changed to 3 per guidelines when ambulation is supervised  6. Added exercises prescribed, ambulate only w/assist, adequate emergency plan, proper med use to safety measures, to 485 form and for F2F encounter Ric Quijano M.D. on 12/19/2024   7. Added cane to DME list  8. Added HF to directions tab

## 2024-12-30 NOTE — PROGRESS NOTES
Medication chart review for [facility] services    Received referral from [facility].   Medications reviewed  compared with discharge summary if available.  Discharge summary date, if applicable:   N/a    Current medication list per [facility]     Medication list one, patient is currently taking    Current Outpatient Medications:     digoxin, 125 mcg, Oral, DAILY    apixaban, 5 mg, Oral, BID    metoprolol tartrate, 25 mg, Oral, BID    rosuvastatin, 40 mg, Oral, DAILY    acetaminophen, 500-1,000 mg, Oral, Q6HRS PRN    HYDROcodone-acetaminophen, 1 Tablet, Oral, Q6HRS PRN    diclofenac sodium, Apply 2 grams to affected area no more than four times per day (Patient not taking: Reported on 12/29/2024)    levothyroxine, 88 mcg, Oral, DAILY    losartan, 25 mg, Oral, BID      Medication list two, drugs that the patient has been prescribed or recommended to take by their healthcare provider on discharge summary  N/a    Allergies   Allergen Reactions    Ace Inhibitors Myalgia     Cramping    Tetanus Antitoxin        Labs     Lab Results   Component Value Date/Time    SODIUM 136 12/19/2024 08:24 AM    POTASSIUM 5.0 12/19/2024 08:24 AM    CHLORIDE 104 12/19/2024 08:24 AM    CO2 23 12/19/2024 08:24 AM    GLUCOSE 110 (H) 12/19/2024 08:24 AM    BUN 30 (H) 12/19/2024 08:24 AM    CREATININE 0.98 12/19/2024 08:24 AM     Lab Results   Component Value Date/Time    ALKPHOSPHAT 95 12/19/2024 08:24 AM    ASTSGOT 32 12/19/2024 08:24 AM    ALTSGPT 18 12/19/2024 08:24 AM    TBILIRUBIN 0.4 12/19/2024 08:24 AM    ALBUMIN 4.1 12/19/2024 08:24 AM        Assessment for clinically significant drug interactions, drug omissions/additions, duplicative therapies.            CC   Pcp Pt States None  No address on file  Fax: None    [facility]  Phone [phone] fax [phone]    This note was created using voice recognition software (Dragon). The accuracy of the dictation is limited by the abilities of  the software. I have reviewed the note prior to signing, however some errors in grammar and context are still possible. If you have any questions related to this note please do not hesitate to contact our office.

## 2024-12-31 ENCOUNTER — HOME CARE VISIT (OUTPATIENT)
Dept: HOME HEALTH SERVICES | Facility: HOME HEALTHCARE | Age: 79
End: 2024-12-31
Payer: MEDICARE

## 2024-12-31 VITALS
WEIGHT: 169.56 LBS | OXYGEN SATURATION: 99 % | SYSTOLIC BLOOD PRESSURE: 132 MMHG | TEMPERATURE: 97.8 F | RESPIRATION RATE: 16 BRPM | BODY MASS INDEX: 29.11 KG/M2 | DIASTOLIC BLOOD PRESSURE: 76 MMHG | HEART RATE: 71 BPM

## 2024-12-31 VITALS
RESPIRATION RATE: 16 BRPM | HEART RATE: 71 BPM | OXYGEN SATURATION: 99 % | TEMPERATURE: 97.8 F | SYSTOLIC BLOOD PRESSURE: 132 MMHG | WEIGHT: 169.56 LBS | DIASTOLIC BLOOD PRESSURE: 76 MMHG | BODY MASS INDEX: 29.11 KG/M2

## 2024-12-31 PROCEDURE — G0152 HHCP-SERV OF OT,EA 15 MIN: HCPCS

## 2024-12-31 PROCEDURE — G0151 HHCP-SERV OF PT,EA 15 MIN: HCPCS

## 2024-12-31 ASSESSMENT — ACTIVITIES OF DAILY LIVING (ADL)
LAUNDRY ASSESSED: 1
HOUSEKEEPING ASSESSED: 1
AMBULATION ASSISTANCE: SUPERVISION
TOILETING: 1
AMBULATION ASSISTANCE ON FLAT SURFACES: 1
CURRENT_FUNCTION: SUPERVISION
PHYSICAL TRANSFERS ASSESSED: 1
GROOMING ASSESSED: 1
GROOMING_COMMENTS: SEE OT NOTES
FEEDING_COMMENTS: SEE OT NOTES
BATHING ASSESSED: 1
PHYSICAL TRANSFERS ASSESSED: 1
TELEPHONE USE ASSESSED: 1
AMBULATION ASSISTANCE: 1
TRANSPORTATION ASSESSED: 1
SHOPPING ASSESSED: 1
FEEDING ASSESSED: 1
ORAL_CARE_ASSESSED: 1
AMBULATION ASSISTANCE: 1
BATHING_COMMENTS: SEE OT NOTES

## 2024-12-31 ASSESSMENT — ENCOUNTER SYMPTOMS
PAIN LOCATION: LEFT HAND
PAIN: 1
PAIN SEVERITY GOAL: 0/10
MUSCLE WEAKNESS: 1
SUBJECTIVE PAIN PROGRESSION: WAXING AND WANING
HIGHEST PAIN SEVERITY IN PAST 24 HOURS: 3/10
PAIN LOCATION - PAIN SEVERITY: 0/10
DENIES PAIN: 1
LOWEST PAIN SEVERITY IN PAST 24 HOURS: 0/10

## 2024-12-31 ASSESSMENT — FIBROSIS 4 INDEX
FIB4 SCORE: 2.75
FIB4 SCORE: 2.75

## 2025-01-02 ENCOUNTER — HOME CARE VISIT (OUTPATIENT)
Dept: HOME HEALTH SERVICES | Facility: HOME HEALTHCARE | Age: 80
End: 2025-01-02
Payer: MEDICARE

## 2025-01-02 ENCOUNTER — HOSPITAL ENCOUNTER (OUTPATIENT)
Facility: MEDICAL CENTER | Age: 80
End: 2025-01-02
Attending: PHYSICIAN ASSISTANT
Payer: MEDICARE

## 2025-01-02 LAB — DIGOXIN SERPL-MCNC: 1.3 NG/ML (ref 0.8–2)

## 2025-01-02 PROCEDURE — 84439 ASSAY OF FREE THYROXINE: CPT

## 2025-01-02 PROCEDURE — 84481 FREE ASSAY (FT-3): CPT

## 2025-01-02 PROCEDURE — 80162 ASSAY OF DIGOXIN TOTAL: CPT

## 2025-01-02 PROCEDURE — 84443 ASSAY THYROID STIM HORMONE: CPT

## 2025-01-02 PROCEDURE — 83880 ASSAY OF NATRIURETIC PEPTIDE: CPT | Mod: GZ

## 2025-01-02 PROCEDURE — G0299 HHS/HOSPICE OF RN EA 15 MIN: HCPCS

## 2025-01-02 ASSESSMENT — FIBROSIS 4 INDEX: FIB4 SCORE: 2.75

## 2025-01-03 LAB
NT-PROBNP SERPL IA-MCNC: 4402 PG/ML (ref 0–125)
T3FREE SERPL-MCNC: 2.13 PG/ML (ref 2–4.4)
T4 FREE SERPL-MCNC: 1.42 NG/DL (ref 0.93–1.7)
TSH SERPL-ACNC: 9.59 UIU/ML (ref 0.35–5.5)

## 2025-01-03 ASSESSMENT — ACTIVITIES OF DAILY LIVING (ADL)
DRESSING_LB_CURRENT_FUNCTION: MINIMUM ASSIST
TOILETING: MINIMUM ASSIST
CURRENT_FUNCTION: MINIMUM ASSIST
LIGHT HOUSEKEEPING: DEPENDENT
PREPARING MEALS: DEPENDENT
BATHING_CURRENT_FUNCTION: MODERATE ASSIST
LAUNDRY: DEPENDENT
TRANSPORTATION: DEPENDENT
ORAL_CARE_CURRENT_FUNCTION: NEEDS ASSISTANCE
AMBULATION ASSISTANCE: STAND BY ASSIST
USING THE TELPHONE: INDEPENDENT
SHOPPING: DEPENDENT
GROOMING_CURRENT_FUNCTION: CONTACT GUARD ASSIST
FEEDING: CONTACT GUARD ASSIST
DRESSING_UB_CURRENT_FUNCTION: MINIMUM ASSIST

## 2025-01-04 ENCOUNTER — HOME CARE VISIT (OUTPATIENT)
Dept: HOME HEALTH SERVICES | Facility: HOME HEALTHCARE | Age: 80
End: 2025-01-04
Payer: MEDICARE

## 2025-01-05 VITALS
TEMPERATURE: 99 F | BODY MASS INDEX: 29.18 KG/M2 | WEIGHT: 170 LBS | HEART RATE: 80 BPM | OXYGEN SATURATION: 94 % | RESPIRATION RATE: 17 BRPM | DIASTOLIC BLOOD PRESSURE: 80 MMHG | SYSTOLIC BLOOD PRESSURE: 132 MMHG

## 2025-01-05 ASSESSMENT — ENCOUNTER SYMPTOMS
BOWEL PATTERN NORMAL: 1
LOWEST PAIN SEVERITY IN PAST 24 HOURS: 0/10
PAIN SEVERITY GOAL: 0/10
PAIN LOCATION: LOWER BACK
MUSCLE WEAKNESS: 1
LAST BOWEL MOVEMENT: 67205
STOOL FREQUENCY: LESS THAN DAILY
LOWER EXTREMITY EDEMA: 1
PAIN LOCATION - PAIN FREQUENCY: INTERMITTENT
PAIN LOCATION - RELIEVING FACTORS: RESTING
HIGHEST PAIN SEVERITY IN PAST 24 HOURS: 2/10
PAIN LOCATION - PAIN QUALITY: ACHY
DEBILITATING PAIN: 1
PAIN: 1
SUBJECTIVE PAIN PROGRESSION: GRADUALLY IMPROVING
PAIN LOCATION - EXACERBATING FACTORS: WALKING, STANDING
PAIN LOCATION - PAIN SEVERITY: 2/10

## 2025-01-05 ASSESSMENT — PATIENT HEALTH QUESTIONNAIRE - PHQ9: CLINICAL INTERPRETATION OF PHQ2 SCORE: 0

## 2025-01-06 ENCOUNTER — HOME CARE VISIT (OUTPATIENT)
Dept: HOME HEALTH SERVICES | Facility: HOME HEALTHCARE | Age: 80
End: 2025-01-06
Payer: MEDICARE

## 2025-01-06 PROCEDURE — G0493 RN CARE EA 15 MIN HH/HOSPICE: HCPCS

## 2025-01-06 ASSESSMENT — FIBROSIS 4 INDEX: FIB4 SCORE: 2.75

## 2025-01-07 ENCOUNTER — HOME CARE VISIT (OUTPATIENT)
Dept: HOME HEALTH SERVICES | Facility: HOME HEALTHCARE | Age: 80
End: 2025-01-07
Payer: MEDICARE

## 2025-01-07 PROCEDURE — G0155 HHCP-SVS OF CSW,EA 15 MIN: HCPCS

## 2025-01-07 ASSESSMENT — ENCOUNTER SYMPTOMS
DENIES PAIN: 1
PERSON REPORTING PAIN: PATIENT
DENIES PAIN: 1
FATIGUE: 1
DIARRHEA: 1
LAST BOWEL MOVEMENT: 67211

## 2025-01-07 ASSESSMENT — ACTIVITIES OF DAILY LIVING (ADL)
LAUNDRY_REQUIRES_ASSISTANCE: 1
SHOPPING_REQUIRES_ASSISTANCE: 1
AMBULATION_REQUIRES_ASSISTANCE: 1
DRESSING_REQUIRES_ASSISTANCE: 1

## 2025-01-07 ASSESSMENT — PATIENT HEALTH QUESTIONNAIRE - PHQ9: CLINICAL INTERPRETATION OF PHQ2 SCORE: 0

## 2025-01-08 VITALS
OXYGEN SATURATION: 96 % | HEART RATE: 81 BPM | WEIGHT: 165.38 LBS | DIASTOLIC BLOOD PRESSURE: 58 MMHG | TEMPERATURE: 98.8 F | BODY MASS INDEX: 28.39 KG/M2 | RESPIRATION RATE: 18 BRPM | SYSTOLIC BLOOD PRESSURE: 90 MMHG

## 2025-01-08 ASSESSMENT — ENCOUNTER SYMPTOMS
MUSCLE WEAKNESS: 1
NAUSEA: DENIES
LIMITED RANGE OF MOTION: 1
SORE THROAT: 1
VOMITING: DENIES

## 2025-01-08 NOTE — CASE COMMUNICATION
Pt c/o a cold and congestion onset 2 days ago.  Pt states that she feels better today but is not completely well yet.  Pt wore a mask during SNV.  This RN also wore a mask during SNV.  Pt states that she has experienced diarrhea with this illness.  Diarrhea onset on this date.  Pt states understanding of how and when to seek medical attention for illness.

## 2025-01-09 ENCOUNTER — HOME CARE VISIT (OUTPATIENT)
Dept: HOME HEALTH SERVICES | Facility: HOME HEALTHCARE | Age: 80
End: 2025-01-09
Payer: MEDICARE

## 2025-01-09 VITALS
SYSTOLIC BLOOD PRESSURE: 100 MMHG | HEART RATE: 83 BPM | TEMPERATURE: 99 F | RESPIRATION RATE: 16 BRPM | DIASTOLIC BLOOD PRESSURE: 72 MMHG | OXYGEN SATURATION: 95 % | BODY MASS INDEX: 27.81 KG/M2 | WEIGHT: 162 LBS

## 2025-01-09 VITALS
WEIGHT: 162 LBS | DIASTOLIC BLOOD PRESSURE: 72 MMHG | SYSTOLIC BLOOD PRESSURE: 100 MMHG | TEMPERATURE: 99 F | HEART RATE: 83 BPM | BODY MASS INDEX: 27.81 KG/M2 | OXYGEN SATURATION: 95 % | RESPIRATION RATE: 16 BRPM

## 2025-01-09 PROCEDURE — G0493 RN CARE EA 15 MIN HH/HOSPICE: HCPCS

## 2025-01-09 PROCEDURE — G0152 HHCP-SERV OF OT,EA 15 MIN: HCPCS

## 2025-01-09 ASSESSMENT — FIBROSIS 4 INDEX
FIB4 SCORE: 2.75
FIB4 SCORE: 2.75

## 2025-01-10 ENCOUNTER — HOME CARE VISIT (OUTPATIENT)
Dept: HOME HEALTH SERVICES | Facility: HOME HEALTHCARE | Age: 80
End: 2025-01-10
Payer: MEDICARE

## 2025-01-10 VITALS
WEIGHT: 161.8 LBS | BODY MASS INDEX: 27.77 KG/M2 | RESPIRATION RATE: 16 BRPM | DIASTOLIC BLOOD PRESSURE: 60 MMHG | TEMPERATURE: 97.9 F | SYSTOLIC BLOOD PRESSURE: 100 MMHG | HEART RATE: 67 BPM | OXYGEN SATURATION: 99 %

## 2025-01-10 PROCEDURE — G0151 HHCP-SERV OF PT,EA 15 MIN: HCPCS

## 2025-01-10 ASSESSMENT — ENCOUNTER SYMPTOMS
STOOL FREQUENCY: DAILY
VOMITING: DENIES
MUSCLE WEAKNESS: 1
DENIES PAIN: 1
COUGH: 1
LAST BOWEL MOVEMENT: 67212
DIARRHEA: 1
DENIES PAIN: 1
FATIGUES EASILY: 1
COUGH CHARACTERISTICS: NON-PRODUCTIVE

## 2025-01-10 ASSESSMENT — ACTIVITIES OF DAILY LIVING (ADL)
AMBULATION ASSISTANCE: STAND BY ASSIST
CURRENT_FUNCTION: STAND BY ASSIST

## 2025-01-10 ASSESSMENT — FIBROSIS 4 INDEX: FIB4 SCORE: 2.75

## 2025-01-11 ENCOUNTER — HOME CARE VISIT (OUTPATIENT)
Dept: HOME HEALTH SERVICES | Facility: HOME HEALTHCARE | Age: 80
End: 2025-01-11
Payer: MEDICARE

## 2025-01-11 VITALS
OXYGEN SATURATION: 96 % | TEMPERATURE: 98.3 F | RESPIRATION RATE: 16 BRPM | SYSTOLIC BLOOD PRESSURE: 112 MMHG | DIASTOLIC BLOOD PRESSURE: 70 MMHG | HEART RATE: 64 BPM

## 2025-01-11 PROCEDURE — G0152 HHCP-SERV OF OT,EA 15 MIN: HCPCS

## 2025-01-14 ENCOUNTER — HOME CARE VISIT (OUTPATIENT)
Dept: HOME HEALTH SERVICES | Facility: HOME HEALTHCARE | Age: 80
End: 2025-01-14
Payer: MEDICARE

## 2025-01-14 VITALS
DIASTOLIC BLOOD PRESSURE: 78 MMHG | WEIGHT: 160.13 LBS | HEART RATE: 64 BPM | TEMPERATURE: 97.6 F | SYSTOLIC BLOOD PRESSURE: 122 MMHG | OXYGEN SATURATION: 99 % | BODY MASS INDEX: 27.49 KG/M2 | RESPIRATION RATE: 16 BRPM

## 2025-01-14 PROCEDURE — G0151 HHCP-SERV OF PT,EA 15 MIN: HCPCS

## 2025-01-14 ASSESSMENT — ENCOUNTER SYMPTOMS
PAIN LOCATION - EXACERBATING FACTORS: WALKING, STANDING
SUBJECTIVE PAIN PROGRESSION: UNCHANGED
PAIN LOCATION - RELIEVING FACTORS: REST, MEDS
PAIN LOCATION: BACK
PAIN LOCATION - PAIN QUALITY: ACHY
PAIN: 1
HIGHEST PAIN SEVERITY IN PAST 24 HOURS: 2/10
LOWEST PAIN SEVERITY IN PAST 24 HOURS: 1/10
PAIN SEVERITY GOAL: 0/10
PAIN LOCATION - PAIN SEVERITY: 2/10
PAIN LOCATION - PAIN FREQUENCY: WITH ACTIVITY

## 2025-01-14 ASSESSMENT — FIBROSIS 4 INDEX: FIB4 SCORE: 2.75

## 2025-01-15 ENCOUNTER — HOME CARE VISIT (OUTPATIENT)
Dept: HOME HEALTH SERVICES | Facility: HOME HEALTHCARE | Age: 80
End: 2025-01-15
Payer: MEDICARE

## 2025-01-15 ASSESSMENT — ENCOUNTER SYMPTOMS
PAIN LOCATION - EXACERBATING FACTORS: SITTING
LOWEST PAIN SEVERITY IN PAST 24 HOURS: 0/10
PAIN LOCATION - PAIN FREQUENCY: INTERMITTENT
PAIN LOCATION - RELIEVING FACTORS: CHANGING POSITIONS
PAIN: 1
PAIN SEVERITY GOAL: 0/10
HIGHEST PAIN SEVERITY IN PAST 24 HOURS: 2/10
SUBJECTIVE PAIN PROGRESSION: WAXING AND WANING
DENIES PAIN: 1
PAIN LOCATION - PAIN SEVERITY: 2/10
PAIN LOCATION: BACK

## 2025-01-16 ENCOUNTER — HOME CARE VISIT (OUTPATIENT)
Dept: HOME HEALTH SERVICES | Facility: HOME HEALTHCARE | Age: 80
End: 2025-01-16
Payer: MEDICARE

## 2025-01-16 PROCEDURE — G0151 HHCP-SERV OF PT,EA 15 MIN: HCPCS

## 2025-01-16 PROCEDURE — G0299 HHS/HOSPICE OF RN EA 15 MIN: HCPCS

## 2025-01-16 ASSESSMENT — ENCOUNTER SYMPTOMS
PAIN: 1
SUBJECTIVE PAIN PROGRESSION: UNCHANGED
PAIN LOCATION - RELIEVING FACTORS: REST
LOWEST PAIN SEVERITY IN PAST 24 HOURS: 0/10
PAIN LOCATION - EXACERBATING FACTORS: STANDING AND WALKING
PAIN LOCATION - PAIN FREQUENCY: WITH ACTIVITY
PAIN SEVERITY GOAL: 0/10
PAIN LOCATION - PAIN QUALITY: ACHY
PAIN LOCATION: BACK
HIGHEST PAIN SEVERITY IN PAST 24 HOURS: 3/10
PAIN LOCATION - PAIN SEVERITY: 3/10

## 2025-01-16 ASSESSMENT — FIBROSIS 4 INDEX: FIB4 SCORE: 2.75

## 2025-01-17 ENCOUNTER — HOME CARE VISIT (OUTPATIENT)
Dept: HOME HEALTH SERVICES | Facility: HOME HEALTHCARE | Age: 80
End: 2025-01-17
Payer: MEDICARE

## 2025-01-17 VITALS
HEART RATE: 81 BPM | OXYGEN SATURATION: 98 % | SYSTOLIC BLOOD PRESSURE: 108 MMHG | TEMPERATURE: 98.6 F | DIASTOLIC BLOOD PRESSURE: 58 MMHG

## 2025-01-17 PROCEDURE — G0152 HHCP-SERV OF OT,EA 15 MIN: HCPCS

## 2025-01-17 ASSESSMENT — ENCOUNTER SYMPTOMS
SUBJECTIVE PAIN PROGRESSION: GRADUALLY IMPROVING
PAIN SEVERITY GOAL: 0/10
PAIN LOCATION - PAIN FREQUENCY: INTERMITTENT
PAIN: 1
HIGHEST PAIN SEVERITY IN PAST 24 HOURS: 2/10
PAIN LOCATION: BACK
LOWEST PAIN SEVERITY IN PAST 24 HOURS: 0/10
PAIN LOCATION - PAIN SEVERITY: 2/10

## 2025-01-17 NOTE — CASE COMMUNICATION
Patient was discussed in IDT today due to recent admission. Discussed plans for discharge with the treatment team. The treatment team currently involves the following disciplines: nursing, pt/ot. Plan for SN to dc in 2 weeks

## 2025-01-19 VITALS
DIASTOLIC BLOOD PRESSURE: 64 MMHG | TEMPERATURE: 98.6 F | HEART RATE: 84 BPM | RESPIRATION RATE: 16 BRPM | BODY MASS INDEX: 27.62 KG/M2 | SYSTOLIC BLOOD PRESSURE: 104 MMHG | OXYGEN SATURATION: 95 % | WEIGHT: 160.9 LBS

## 2025-01-19 ASSESSMENT — PATIENT HEALTH QUESTIONNAIRE - PHQ9: CLINICAL INTERPRETATION OF PHQ2 SCORE: 0

## 2025-01-19 ASSESSMENT — ENCOUNTER SYMPTOMS
FATIGUES EASILY: 1
DENIES PAIN: 1
LAST BOWEL MOVEMENT: 67220
STOOL FREQUENCY: DAILY
BOWEL PATTERN NORMAL: 1

## 2025-01-21 ENCOUNTER — HOME CARE VISIT (OUTPATIENT)
Dept: HOME HEALTH SERVICES | Facility: HOME HEALTHCARE | Age: 80
End: 2025-01-21
Payer: MEDICARE

## 2025-01-21 VITALS
TEMPERATURE: 97.6 F | DIASTOLIC BLOOD PRESSURE: 60 MMHG | SYSTOLIC BLOOD PRESSURE: 118 MMHG | OXYGEN SATURATION: 97 % | WEIGHT: 161 LBS | HEART RATE: 65 BPM | BODY MASS INDEX: 27.64 KG/M2 | RESPIRATION RATE: 16 BRPM

## 2025-01-21 VITALS
TEMPERATURE: 97.6 F | SYSTOLIC BLOOD PRESSURE: 118 MMHG | OXYGEN SATURATION: 97 % | DIASTOLIC BLOOD PRESSURE: 60 MMHG | BODY MASS INDEX: 27.64 KG/M2 | WEIGHT: 161 LBS | RESPIRATION RATE: 16 BRPM | HEART RATE: 65 BPM

## 2025-01-21 PROCEDURE — G0151 HHCP-SERV OF PT,EA 15 MIN: HCPCS

## 2025-01-21 PROCEDURE — G0152 HHCP-SERV OF OT,EA 15 MIN: HCPCS

## 2025-01-21 ASSESSMENT — ENCOUNTER SYMPTOMS
PAIN LOCATION - PAIN SEVERITY: 2/10
PAIN LOCATION - PAIN FREQUENCY: WITH ACTIVITY
PAIN LOCATION - EXACERBATING FACTORS: CHRONIC
PAIN SEVERITY GOAL: 0/10
PAIN LOCATION - RELIEVING FACTORS: REST
HIGHEST PAIN SEVERITY IN PAST 24 HOURS: 2/10
PAIN: 1
LOWEST PAIN SEVERITY IN PAST 24 HOURS: 2/10
PAIN LOCATION: BACK
SUBJECTIVE PAIN PROGRESSION: UNCHANGED
PAIN LOCATION - PAIN QUALITY: ACHY

## 2025-01-21 ASSESSMENT — FIBROSIS 4 INDEX
FIB4 SCORE: 2.75
FIB4 SCORE: 2.75

## 2025-01-23 ENCOUNTER — HOSPITAL ENCOUNTER (OUTPATIENT)
Facility: MEDICAL CENTER | Age: 80
End: 2025-01-23
Attending: INTERNAL MEDICINE
Payer: MEDICARE

## 2025-01-23 ENCOUNTER — HOME CARE VISIT (OUTPATIENT)
Dept: HOME HEALTH SERVICES | Facility: HOME HEALTHCARE | Age: 80
End: 2025-01-23
Payer: MEDICARE

## 2025-01-23 LAB
ANION GAP SERPL CALC-SCNC: 11 MMOL/L (ref 7–16)
BUN SERPL-MCNC: 24 MG/DL (ref 8–22)
CALCIUM SERPL-MCNC: 9.4 MG/DL (ref 8.5–10.5)
CHLORIDE SERPL-SCNC: 101 MMOL/L (ref 96–112)
CO2 SERPL-SCNC: 25 MMOL/L (ref 20–33)
CREAT SERPL-MCNC: 0.97 MG/DL (ref 0.5–1.4)
GFR SERPLBLD CREATININE-BSD FMLA CKD-EPI: 58 ML/MIN/1.73 M 2
GLUCOSE SERPL-MCNC: 86 MG/DL (ref 65–99)
NT-PROBNP SERPL IA-MCNC: 3153 PG/ML (ref 0–125)
POTASSIUM SERPL-SCNC: 4.5 MMOL/L (ref 3.6–5.5)
SODIUM SERPL-SCNC: 137 MMOL/L (ref 135–145)

## 2025-01-23 PROCEDURE — 80048 BASIC METABOLIC PNL TOTAL CA: CPT

## 2025-01-23 PROCEDURE — 83880 ASSAY OF NATRIURETIC PEPTIDE: CPT | Mod: GA

## 2025-01-23 PROCEDURE — G0299 HHS/HOSPICE OF RN EA 15 MIN: HCPCS

## 2025-01-23 ASSESSMENT — FIBROSIS 4 INDEX: FIB4 SCORE: 2.75

## 2025-01-27 VITALS
SYSTOLIC BLOOD PRESSURE: 120 MMHG | BODY MASS INDEX: 27.64 KG/M2 | DIASTOLIC BLOOD PRESSURE: 70 MMHG | TEMPERATURE: 97.6 F | WEIGHT: 161 LBS | RESPIRATION RATE: 17 BRPM | HEART RATE: 62 BPM | OXYGEN SATURATION: 97 %

## 2025-01-27 ASSESSMENT — ENCOUNTER SYMPTOMS
PAIN LOCATION - EXACERBATING FACTORS: STANDING
SEVERE DYSPNEA: 1
SEVERE DYSPNEA: 1
HIGHEST PAIN SEVERITY IN PAST 24 HOURS: 3/10
BOWEL PATTERN NORMAL: 1
LOWEST PAIN SEVERITY IN PAST 24 HOURS: 0/10
PAIN LOCATION - PAIN FREQUENCY: INTERMITTENT
PAIN: 1
DENIES PAIN: 1
PAIN SEVERITY GOAL: 0/10
LAST BOWEL MOVEMENT: 67228
PAIN LOCATION - RELIEVING FACTORS: LYING DOWN
STOOL FREQUENCY: DAILY
PAIN LOCATION: BACK
PAIN LOCATION - RELIEVING FACTORS: RESTING
SUBJECTIVE PAIN PROGRESSION: GRADUALLY IMPROVING
PAIN LOCATION - EXACERBATING FACTORS: STANDING
PAIN LOCATION - PAIN SEVERITY: 0/10

## 2025-01-27 ASSESSMENT — ACTIVITIES OF DAILY LIVING (ADL)
TRANSPORTATION ASSESSED: 1
ORAL_CARE_ASSESSED: 1
FEEDING ASSESSED: 1
AMBULATION ASSISTANCE: SUPERVISION
DRESSING_UB_CURRENT_FUNCTION: STAND BY ASSIST
LIGHT HOUSEKEEPING: NEEDS ASSISTANCE
TOILETING: INDEPENDENT
TELEPHONE USE ASSESSED: 1
CURRENT_FUNCTION: CONTACT GUARD ASSIST
PHYSICAL TRANSFERS ASSESSED: 1
HOUSEKEEPING ASSESSED: 1
GROOMING ASSESSED: 1
LAUNDRY ASSESSED: 1
LAUNDRY: NEEDS ASSISTANCE
ORAL_CARE_CURRENT_FUNCTION: INDEPENDENT
GROOMING_CURRENT_FUNCTION: INDEPENDENT
BATHING ASSESSED: 1
PREPARING MEALS: NEEDS ASSISTANCE
TRANSPORTATION: DEPENDENT
SHOPPING ASSESSED: 1
AMBULATION ASSISTANCE: 1
DRESSING_LB_CURRENT_FUNCTION: STAND BY ASSIST
BATHING_CURRENT_FUNCTION: MINIMUM ASSIST
USING THE TELPHONE: INDEPENDENT
FEEDING: INDEPENDENT
SHOPPING: DEPENDENT
TOILETING: 1

## 2025-01-27 ASSESSMENT — PATIENT HEALTH QUESTIONNAIRE - PHQ9: CLINICAL INTERPRETATION OF PHQ2 SCORE: 0

## 2025-01-28 ENCOUNTER — HOME CARE VISIT (OUTPATIENT)
Dept: HOME HEALTH SERVICES | Facility: HOME HEALTHCARE | Age: 80
End: 2025-01-28
Payer: MEDICARE

## 2025-01-28 VITALS
BODY MASS INDEX: 27.83 KG/M2 | WEIGHT: 162.13 LBS | SYSTOLIC BLOOD PRESSURE: 130 MMHG | DIASTOLIC BLOOD PRESSURE: 70 MMHG | OXYGEN SATURATION: 97 % | TEMPERATURE: 98.1 F | HEART RATE: 57 BPM | RESPIRATION RATE: 16 BRPM

## 2025-01-28 PROCEDURE — G0152 HHCP-SERV OF OT,EA 15 MIN: HCPCS

## 2025-01-28 ASSESSMENT — ENCOUNTER SYMPTOMS
LOWEST PAIN SEVERITY IN PAST 24 HOURS: 0/10
PAIN SEVERITY GOAL: 0/10
PAIN LOCATION - PAIN SEVERITY: 2/10
HIGHEST PAIN SEVERITY IN PAST 24 HOURS: 2/10
SUBJECTIVE PAIN PROGRESSION: GRADUALLY IMPROVING
PAIN LOCATION: BACK
PAIN: 1

## 2025-01-28 ASSESSMENT — FIBROSIS 4 INDEX: FIB4 SCORE: 2.75

## 2025-01-30 ENCOUNTER — HOSPITAL ENCOUNTER (OUTPATIENT)
Facility: MEDICAL CENTER | Age: 80
End: 2025-01-30
Attending: FAMILY MEDICINE
Payer: MEDICARE

## 2025-01-30 ENCOUNTER — HOME CARE VISIT (OUTPATIENT)
Dept: HOME HEALTH SERVICES | Facility: HOME HEALTHCARE | Age: 80
End: 2025-01-30
Payer: MEDICARE

## 2025-01-30 LAB
T4 FREE SERPL-MCNC: 1.42 NG/DL (ref 0.93–1.7)
TSH SERPL-ACNC: 6.17 UIU/ML (ref 0.35–5.5)

## 2025-01-30 PROCEDURE — 84439 ASSAY OF FREE THYROXINE: CPT | Mod: GZ

## 2025-01-30 PROCEDURE — 84443 ASSAY THYROID STIM HORMONE: CPT | Mod: GZ

## 2025-01-30 PROCEDURE — G0299 HHS/HOSPICE OF RN EA 15 MIN: HCPCS

## 2025-01-30 ASSESSMENT — FIBROSIS 4 INDEX: FIB4 SCORE: 2.75

## 2025-02-02 VITALS
SYSTOLIC BLOOD PRESSURE: 122 MMHG | DIASTOLIC BLOOD PRESSURE: 70 MMHG | WEIGHT: 162.6 LBS | OXYGEN SATURATION: 98 % | TEMPERATURE: 97.6 F | BODY MASS INDEX: 27.91 KG/M2 | HEART RATE: 76 BPM | RESPIRATION RATE: 16 BRPM

## 2025-02-02 ASSESSMENT — ENCOUNTER SYMPTOMS
HYPERTENSION: 1
DENIES PAIN: 1
STOOL FREQUENCY: DAILY
BOWEL PATTERN NORMAL: 1
LAST BOWEL MOVEMENT: 67233

## 2025-02-02 ASSESSMENT — SOCIAL DETERMINANTS OF HEALTH (SDOH): IS CONCERNED ABOUT INCOME: N

## 2025-02-02 ASSESSMENT — PATIENT HEALTH QUESTIONNAIRE - PHQ9: CLINICAL INTERPRETATION OF PHQ2 SCORE: 0

## 2025-02-04 ENCOUNTER — HOME CARE VISIT (OUTPATIENT)
Dept: HOME HEALTH SERVICES | Facility: HOME HEALTHCARE | Age: 80
End: 2025-02-04
Payer: MEDICARE

## 2025-02-04 VITALS
RESPIRATION RATE: 16 BRPM | HEART RATE: 63 BPM | DIASTOLIC BLOOD PRESSURE: 76 MMHG | BODY MASS INDEX: 27.72 KG/M2 | TEMPERATURE: 99 F | OXYGEN SATURATION: 97 % | WEIGHT: 161.5 LBS | SYSTOLIC BLOOD PRESSURE: 130 MMHG

## 2025-02-04 PROCEDURE — G0152 HHCP-SERV OF OT,EA 15 MIN: HCPCS

## 2025-02-04 ASSESSMENT — ENCOUNTER SYMPTOMS: DENIES PAIN: 1

## 2025-02-04 ASSESSMENT — FIBROSIS 4 INDEX: FIB4 SCORE: 2.75

## 2025-02-14 ENCOUNTER — HOME CARE VISIT (OUTPATIENT)
Dept: HOME HEALTH SERVICES | Facility: HOME HEALTHCARE | Age: 80
End: 2025-02-14
Payer: MEDICARE

## 2025-02-14 VITALS
OXYGEN SATURATION: 97 % | DIASTOLIC BLOOD PRESSURE: 78 MMHG | HEART RATE: 67 BPM | SYSTOLIC BLOOD PRESSURE: 116 MMHG | RESPIRATION RATE: 16 BRPM

## 2025-02-14 PROCEDURE — G0152 HHCP-SERV OF OT,EA 15 MIN: HCPCS

## 2025-02-14 ASSESSMENT — ACTIVITIES OF DAILY LIVING (ADL)
HOME_HEALTH_OASIS: 00
OASIS_M1830: 00

## 2025-02-14 ASSESSMENT — ENCOUNTER SYMPTOMS: DENIES PAIN: 1

## 2025-05-05 ENCOUNTER — TELEPHONE (OUTPATIENT)
Dept: SURGERY | Facility: MEDICAL CENTER | Age: 80
End: 2025-05-05
Payer: MEDICARE

## 2025-05-07 NOTE — PROGRESS NOTES
REFERRING PHYSICIAN: Narda Martinez MD    CONSULTING PHYSICIAN: Sandra Calderón MD, FACS    CHIEF COMPLAINT: Fatigue    HISTORY OF PRESENT ILLNESS: The patient is an 79 y.o. female with history hypertension, hyperlipidemia, atrial fibrillation on Eliquis, hypothyroidism, JAVIER intolerant to CPAP and severe mitral regurgitation. Today, she complains of bilateral lower extremity edema since January and has been on lasix. Occasional self limiting chest pain when lying down. She  denies shortness of breath, dizziness, syncope, orthopnea, or PND. She is not very active and walks with cane. She had a recent fall in December.    PAST MEDICAL HISTORY:   Active Ambulatory Problems     Diagnosis Date Noted    No Active Ambulatory Problems     Resolved Ambulatory Problems     Diagnosis Date Noted    No Resolved Ambulatory Problems     Past Medical History:   Diagnosis Date    Arrhythmia     Hyperlipidemia     Hypertension     JAVIER (obstructive sleep apnea)     Osteoporosis, senile     Thyroid disease      PAST SURGICAL HISTORY:   Past Surgical History:   Procedure Laterality Date    ABDOMINAL HYSTERECTOMY TOTAL      ORIF, WRIST Left      ALLERGIES:   Allergies   Allergen Reactions    Ace Inhibitors Myalgia     Cramping    Tetanus Antitoxin      CURRENT MEDICATIONS:   Current Outpatient Medications:     furosemide (LASIX) 20 MG Tab, Take 20 mg by mouth every day. Indications: Edema, Disp: , Rfl:     Potassium Chloride 10 MEQ Pack, Take 1 Tablet by mouth every day at 6 PM. Indications: Low Amount of Potassium in the Blood, Disp: , Rfl:     digoxin (LANOXIN) 125 MCG Tab, Take 125 mcg by mouth every day. Indications: Atrial Fibrillation, Disp: , Rfl:     apixaban (ELIQUIS) 5mg Tab, Take 5 mg by mouth 2 times a day. Indications: Atrial Fibrillation, Disp: , Rfl:     metoprolol tartrate (LOPRESSOR) 25 MG Tab, Take 25 mg by mouth 2 times a day. Indications: High Blood Pressure, Disp: , Rfl:     acetaminophen (TYLENOL) 500 MG Tab,  Take 500-1,000 mg by mouth every 6 hours as needed for Mild Pain or Moderate Pain. Indications: mild to moderate pain, Disp: , Rfl:     levothyroxine (SYNTHROID) 125 MCG TABS, Take 88 mcg by mouth every day. Indications: Underactive Thyroid, Disp: , Rfl:     benzonatate (TESSALON) 200 MG capsule, Take 200 mg by mouth 3 times a day as needed for Cough. Indications: Cough, Disp: , Rfl:     bisacodyl (DULCOLAX) 5 MG EC tablet, Take 5 mg by mouth 1 time a day as needed (constipation). Indications: Constipation (Patient not taking: Reported on 5/14/2025), Disp: , Rfl:     diclofenac sodium (VOLTAREN) 1 % Gel, Apply 2 grams to affected area no more than four times per day (Patient not taking: Reported on 5/14/2025), Disp: 100 g, Rfl: 0    FAMILY HISTORY:   Family History   Problem Relation Age of Onset    Heart Disease Mother     Heart Disease Father     Heart Disease Brother     Stroke Maternal Grandmother       SOCIAL HISTORY:   Social History     Socioeconomic History    Marital status: Single     Spouse name: Not on file    Number of children: Not on file    Years of education: Not on file    Highest education level: Not on file   Occupational History    Not on file   Tobacco Use    Smoking status: Never    Smokeless tobacco: Not on file   Vaping Use    Vaping status: Never Used   Substance and Sexual Activity    Alcohol use: No    Drug use: Never    Sexual activity: Not on file   Other Topics Concern    Not on file   Social History Narrative    Not on file     Social Drivers of Health     Financial Resource Strain: Not on file   Food Insecurity: Not on file   Transportation Needs: Not on file   Physical Activity: Not on file   Stress: Not on file   Social Connections: Feeling Socially Integrated (2/14/2025)    OASIS : Social Isolation     Frequency of experiencing loneliness or isolation: Never   Intimate Partner Violence: Not on file   Housing Stability: Not on file     REVIEW OF SYSTEMS:  Review of Systems  "  Constitutional:  Positive for malaise/fatigue.   HENT:  Positive for hearing loss.    Eyes: Negative.    Respiratory:  Positive for shortness of breath.    Cardiovascular:  Positive for leg swelling.   Gastrointestinal: Negative.    Genitourinary: Negative.    Musculoskeletal:  Positive for myalgias.   Skin: Negative.    Neurological:  Positive for dizziness.   Endo/Heme/Allergies: Negative.    Psychiatric/Behavioral: Negative.       PHYSICAL EXAMINATION:    /66 (BP Location: Left arm, Patient Position: Sitting, BP Cuff Size: Adult)   Pulse 72   Temp 36.1 °C (96.9 °F) (Temporal)   Ht 1.626 m (5' 4\")   Wt 78 kg (171 lb 15.3 oz)   SpO2 96%   BMI 29.52 kg/m²      Physical Exam  Constitutional:       General: She is not in acute distress.  HENT:      Head: Normocephalic.   Eyes:      Pupils: Pupils are equal, round, and reactive to light.   Cardiovascular:      Rate and Rhythm: Normal rate and regular rhythm.      Heart sounds: Murmur heard.      Systolic murmur is present with a grade of 3/6.      No gallop.   Pulmonary:      Effort: Pulmonary effort is normal. No respiratory distress.      Breath sounds: Normal breath sounds. No wheezing or rales.   Abdominal:      General: Bowel sounds are normal. There is no distension.      Palpations: Abdomen is soft.      Tenderness: There is no abdominal tenderness.   Musculoskeletal:         General: Normal range of motion.      Cervical back: Neck supple.   Skin:     General: Skin is warm and dry.   Neurological:      Mental Status: She is alert and oriented to person, place, and time.   Psychiatric:         Mood and Affect: Mood and affect normal.         Cognition and Memory: Memory normal.         Judgment: Judgment normal.     LABS REVIEWED:  Lab Results   Component Value Date/Time    SODIUM 137 01/23/2025 09:20 AM    POTASSIUM 4.5 01/23/2025 09:20 AM    CHLORIDE 101 01/23/2025 09:20 AM    CO2 25 01/23/2025 09:20 AM    GLUCOSE 86 01/23/2025 09:20 AM    BUN 24 " "(H) 01/23/2025 09:20 AM    CREATININE 0.97 01/23/2025 09:20 AM      No results found for: \"PROTHROMBTM\", \"INR\"   Lab Results   Component Value Date/Time    WBC 11.0 (H) 12/19/2024 08:24 AM    RBC 4.54 12/19/2024 08:24 AM    HEMOGLOBIN 13.4 12/19/2024 08:24 AM    HEMATOCRIT 39.6 12/19/2024 08:24 AM    MCV 87.2 12/19/2024 08:24 AM    MCH 29.5 12/19/2024 08:24 AM    MCHC 33.8 12/19/2024 08:24 AM    MPV 10.9 12/19/2024 08:24 AM    NEUTSPOLYS 80.70 (H) 12/19/2024 08:24 AM    LYMPHOCYTES 9.80 (L) 12/19/2024 08:24 AM    MONOCYTES 7.10 12/19/2024 08:24 AM    EOSINOPHILS 1.20 12/19/2024 08:24 AM    BASOPHILS 0.40 12/19/2024 08:24 AM      IMAGING REVIEWED AND INTERPRETED:    TRANSESOPHAGEAL ECHOCARDIOGRAM [facility] 4/21/2025:  1. The left ventricle appears normal in size and systolic function by visual estimation.  2. The left atrium is severely dilated. There is no evidence of thrombus in the left atrial appendage with peak velocity of 29 cm/s by Pulsed wave Doppler.  3. The right atrium is dilated.  4. The aortic valve is trileaflet with mild sclerosis/calcification without stenosis. Trace to mild aortic regurgitation.  5. There is mitral annular calcification and dilation resulting in grade 3+, moderately severe regurgitation. The posterior leaflet measures, 0.6 cm.  6. The right ventricle appears normal in size and systolic function.      CARDIAC CATHETERIZATION   None    CT SCAN CHEST   None      IMPRESSION:  Severe symptomatic mitral regurgitation, chronic HFpEF, atrial fibrillation (on Eliquis), hypertension, hyperlipidemia, hypothyroidism, JAVIER    PLAN:  I do not recommend surgical mitral valve repair or replacement due to excessive operative risk and concerns for recovery.  I estimate this to be greater than 8%.  The STS mortality risk score is 6.1% and the morbidity and mortality risk score is 36% for MVR. The scores were discussed with patient.  She may be a candidate for transcatheter [id number] mitral valve repair " (ROXANNE, MitraClip) and I recommend completing her workup.  The procedure, its risks, benefits, potential complications and alternative treatments were discussed with the patient in detail.  The patient will be referred to RACHELLE Shore MD.    Findings and recommendations have been discussed with the patient’s cardiologist, Narda Martinez MD.  Thank you for this very challenging consultation and participation in the patient’s care.  I will keep you apprised of all future developments.    Sincerely,    Sandra Calderón MD, FACS

## 2025-05-14 ENCOUNTER — OFFICE VISIT (OUTPATIENT)
Facility: MEDICAL CENTER | Age: 80
End: 2025-05-14
Payer: MEDICARE

## 2025-05-14 VITALS
DIASTOLIC BLOOD PRESSURE: 66 MMHG | WEIGHT: 171.96 LBS | BODY MASS INDEX: 29.36 KG/M2 | OXYGEN SATURATION: 96 % | SYSTOLIC BLOOD PRESSURE: 122 MMHG | TEMPERATURE: 96.9 F | HEART RATE: 72 BPM | HEIGHT: 64 IN

## 2025-05-14 DIAGNOSIS — I34.0 SEVERE MITRAL REGURGITATION: ICD-10-CM

## 2025-05-14 PROCEDURE — 3074F SYST BP LT 130 MM HG: CPT | Performed by: THORACIC SURGERY (CARDIOTHORACIC VASCULAR SURGERY)

## 2025-05-14 PROCEDURE — 99205 OFFICE O/P NEW HI 60 MIN: CPT | Performed by: THORACIC SURGERY (CARDIOTHORACIC VASCULAR SURGERY)

## 2025-05-14 PROCEDURE — 3078F DIAST BP <80 MM HG: CPT | Performed by: THORACIC SURGERY (CARDIOTHORACIC VASCULAR SURGERY)

## 2025-05-14 ASSESSMENT — ENCOUNTER SYMPTOMS
GASTROINTESTINAL NEGATIVE: 1
DIZZINESS: 1
EYES NEGATIVE: 1
MYALGIAS: 1
PSYCHIATRIC NEGATIVE: 1
SHORTNESS OF BREATH: 1

## 2025-05-14 ASSESSMENT — FIBROSIS 4 INDEX: FIB4 SCORE: 2.75

## 2025-05-16 ENCOUNTER — TELEPHONE (OUTPATIENT)
Dept: CARDIOLOGY | Facility: MEDICAL CENTER | Age: 80
End: 2025-05-16
Payer: MEDICARE

## 2025-05-16 NOTE — TELEPHONE ENCOUNTER
Referral from: Dr. Calderón for sev MR    Patient called on 5/16/2025. LVM for patient to call back and discuss.    First attempt

## 2025-05-16 NOTE — TELEPHONE ENCOUNTER
Called [facility] [phone], transferred to radiology and requested RUDDY images to be pushed. Tech confirmed he would do that now.

## 2025-05-16 NOTE — TELEPHONE ENCOUNTER
Patient called back. Scheduled next available consult with Dr. Shore 5/28/2025.     Need RUDDY images 4/21/2025 pushed from [facility]

## 2025-05-19 ENCOUNTER — HOSPITAL ENCOUNTER (OUTPATIENT)
Dept: RADIOLOGY | Facility: MEDICAL CENTER | Age: 80
End: 2025-05-19
Attending: INTERNAL MEDICINE
Payer: MEDICARE

## 2025-05-21 ENCOUNTER — TELEPHONE (OUTPATIENT)
Dept: CARDIOLOGY | Facility: MEDICAL CENTER | Age: 80
End: 2025-05-21
Payer: MEDICARE

## 2025-05-21 NOTE — TELEPHONE ENCOUNTER
Records needed for upcoming appointment have been requested by RN on 5/16/2025. All other records are up to date per chart review.

## 2025-05-23 ENCOUNTER — APPOINTMENT (OUTPATIENT)
Dept: ADMISSIONS | Facility: MEDICAL CENTER | Age: 80
End: 2025-05-23
Attending: INTERNAL MEDICINE
Payer: MEDICARE

## 2025-05-23 ENCOUNTER — DOCUMENTATION (OUTPATIENT)
Dept: CARDIOLOGY | Facility: MEDICAL CENTER | Age: 80
End: 2025-05-23
Payer: MEDICARE

## 2025-05-23 DIAGNOSIS — Z00.6 EXAMINATION OF PARTICIPANT IN CLINICAL TRIAL: ICD-10-CM

## 2025-05-23 DIAGNOSIS — I34.0 SEVERE MITRAL REGURGITATION: Primary | ICD-10-CM

## 2025-05-23 DIAGNOSIS — I34.0 NONRHEUMATIC MITRAL VALVE REGURGITATION: ICD-10-CM

## 2025-05-23 NOTE — PROGRESS NOTES
[facility] Mitral ROXANNE review: Comments: PMR. P2 prolapse/mixed. Wide A2/P2 lesion. Leaflet length adequate to grasp. Mitral valve leaflets appear clipable. Recommend 1 17mm NTW A2/P2 to start then reassess. Possible second clip NT or start with NT clip and plan for 2. Note: calcified secondary chord

## 2025-05-23 NOTE — Clinical Note
REFERRAL APPROVAL NOTICE         Sent on May 23, 2025                   Debbie Opal  [address] [place] [address]                   Dear Ms. Joseph,    After a careful review of the medical information and benefit coverage, [facility] has processed your referral. See below for additional details.    If applicable, you must be actively enrolled with your insurance for coverage of the authorized service. If you have any questions regarding your coverage, please contact your insurance directly.    REFERRAL INFORMATION   Referral #:  [id number]  Referred-To Department    Referred-By Provider:  Cardiothoracic Surgery    Faraz Shore M.D.    Surgery - Cardiothoracic      [address]  [address]  [facility] [address]  [phone] [address] [facility] [address]  [phone]    Referral Start Date:  05/23/2025  Referral End Date:   05/23/2026             SCHEDULING  If you do not already have an appointment, please call [phone] to make an appointment.     MORE INFORMATION  If you do not already have a [employer] account, sign up at: [email or web address]  You can access your medical information, make appointments, see lab results, billing information, and more.  If you have questions regarding this referral, please contact  the [facility] Referrals department at:             [phone]. Monday - Friday 8:00AM - 5:00PM.     Sincerely,    [facility]

## 2025-05-23 NOTE — TELEPHONE ENCOUNTER
Called patient and moved up appt with Dr. Shore to 5/27/2025. She is agreeable to tentative Mitral ROXANNE procedure 6/3/2025.

## 2025-05-27 ENCOUNTER — OFFICE VISIT (OUTPATIENT)
Dept: CARDIOLOGY | Facility: MEDICAL CENTER | Age: 80
End: 2025-05-27
Attending: INTERNAL MEDICINE
Payer: MEDICARE

## 2025-05-27 ENCOUNTER — TELEPHONE (OUTPATIENT)
Dept: CARDIOLOGY | Facility: MEDICAL CENTER | Age: 80
End: 2025-05-27

## 2025-05-27 ENCOUNTER — DOCUMENTATION (OUTPATIENT)
Dept: CARDIOLOGY | Facility: MEDICAL CENTER | Age: 80
End: 2025-05-27

## 2025-05-27 VITALS
DIASTOLIC BLOOD PRESSURE: 66 MMHG | HEIGHT: 64 IN | OXYGEN SATURATION: 98 % | HEART RATE: 80 BPM | WEIGHT: 176 LBS | BODY MASS INDEX: 30.05 KG/M2 | SYSTOLIC BLOOD PRESSURE: 114 MMHG | RESPIRATION RATE: 18 BRPM

## 2025-05-27 DIAGNOSIS — I10 PRIMARY HYPERTENSION: ICD-10-CM

## 2025-05-27 DIAGNOSIS — I48.0 PAF (PAROXYSMAL ATRIAL FIBRILLATION) (HCC): ICD-10-CM

## 2025-05-27 DIAGNOSIS — E78.49 OTHER HYPERLIPIDEMIA: ICD-10-CM

## 2025-05-27 DIAGNOSIS — Z87.891 FORMER SMOKER: ICD-10-CM

## 2025-05-27 DIAGNOSIS — G47.33 OSA (OBSTRUCTIVE SLEEP APNEA): ICD-10-CM

## 2025-05-27 DIAGNOSIS — I34.0 NONRHEUMATIC MITRAL (VALVE) INSUFFICIENCY: Primary | ICD-10-CM

## 2025-05-27 PROBLEM — E03.9 HYPOTHYROID: Status: ACTIVE | Noted: 2025-05-27

## 2025-05-27 LAB — EKG IMPRESSION: NORMAL

## 2025-05-27 PROCEDURE — 3074F SYST BP LT 130 MM HG: CPT | Performed by: INTERNAL MEDICINE

## 2025-05-27 PROCEDURE — 93005 ELECTROCARDIOGRAM TRACING: CPT | Mod: TC | Performed by: INTERNAL MEDICINE

## 2025-05-27 PROCEDURE — 3078F DIAST BP <80 MM HG: CPT | Performed by: INTERNAL MEDICINE

## 2025-05-27 PROCEDURE — 99213 OFFICE O/P EST LOW 20 MIN: CPT | Performed by: INTERNAL MEDICINE

## 2025-05-27 PROCEDURE — 99205 OFFICE O/P NEW HI 60 MIN: CPT | Performed by: INTERNAL MEDICINE

## 2025-05-27 RX ORDER — AMIODARONE HYDROCHLORIDE 200 MG/1
400 TABLET ORAL 2 TIMES DAILY
Qty: 30 TABLET | Refills: 0 | Status: ON HOLD | OUTPATIENT
Start: 2025-05-27 | End: 2025-06-04

## 2025-05-27 ASSESSMENT — FIBROSIS 4 INDEX: FIB4 SCORE: 2.75

## 2025-05-27 ASSESSMENT — PATIENT HEALTH QUESTIONNAIRE - PHQ9: CLINICAL INTERPRETATION OF PHQ2 SCORE: 0

## 2025-05-27 NOTE — PROGRESS NOTES
Valve Program Functional Assessment:     KCCQ12   1a) Showering/bathin  1b) Walking 1 block on ground: 5  1c) Hurrying or joggin  2) Swellin  3) Fatigue: 7  4) Shortness of breath: 7  5) Sleep sitting up: 5  6) Limited enjoyment of life: 5  7) Spend the rest of your life with HF:3   8a) Hobbies, recreational activities:3  8b) Working or doing household chores:4  8c) Visiting family or friends: 5     Strength   1) _20_____ kg  2) _20_____ kg  3) _20_____ kg  AVG:__20____    LEMUS ADLs  Patient independently preforms...   - Bathing: Yes   - Dressing: Yes   - Toileting: Yes   - Transferring: Yes   - Continence: Yes   - Feeding: Yes   Total Score: _6_/6    Living Situation  Patient lives: alone  Mobility Aids   Patient uses: cane ( PRN)     FRAILTY SCORE: _0_/ 3

## 2025-05-27 NOTE — TELEPHONE ENCOUNTER
Valve Program Consultation: 5/27/25    Mental Status Assessment:  Appearance: normal  Behavior: normal  Mood/Affect: normal  Thought process/content: normal  Cognition: good  Functional ability: mildly frail with prior falls  Dental Concerns: none  Further mental assessment needed: n/a    Post-op Plan of Care:  Support systems: Kin, brother-lives in [place]  Patient understands discharge plan: yes  DME: none  Home health warranted prior to procedure: no  Social concerns for discharge: none  PCP alerted of social concerns: n/a  POLST: n/a  Advance directive: yes active DPOA in chart 8/9/2013-Kin POA  Post-op goal: reduce leg swelling  Medication instructions: Hold vitamins/supplements x7 days, hold Eliquis for 2 days, hold furosemide/potassium morning of    Concerns prior to procedure: She is in afib, rate of 82 bpm, she will stop digoxin tomorrow and start amiodarone oral loading dose of 400 mg BID until procedure on 6/3, then we will attempt DCCV intra-op. She was agreeable to this plan. She was only mildly volume overloaded, recommended leg compression stockings.    Met with patient during New ROXANNE consult.     All patient's questions and concerns were addressed during this visit. They understood pre-operative and post-operative plan of care.    Reviewed patient ROXANNE education packet explaining that information is provided regarding preparation for ROXANNE the night prior, what to expect during the hospital stay, average LOS, and what to look out for post ROXANNE discharge. Explained that patient will require SBE prophylactic antibiotic prior to any dental treatment post ROXANNE. Advised patient not to receive any new vaccinations within 1 week pre- and 1 week post-procedure.     Explained that patient should not eat or drink anything past midnight the day of the procedure. Encouraged patient to wear something clean and comfortable, easy to get on/off to check in Monday morning, time TBD. Patient may have  friends and family in the pre-operational area until patient is taken to the operating room, at which time family and friends will be asked to wait on floor 1 [facility] surgical waiting area. On completion of ROXANNE, heart team will update family. Once update is given, there is some time before family/friends may visit patient in their assigned room. Length of stay on average is one night, however patient may stay longer depending on specific needs at that time. Patient given printed instructions sheet with all above stated instructions. Patient states understanding of all material and education presented today and has no further questions at this time. Encouraged patient again, to contact me with any questions or concerns during this work-up process. Patient states understanding.

## 2025-05-27 NOTE — PATIENT INSTRUCTIONS
STOP digoxin.    START amiodarone 400 mg twice daily until procedure (2-200 mg tablets twice daily).    Obtain moderate compression stockings, moderate compression 20-25 mmHg, wear daily up to knee high.

## 2025-05-28 ENCOUNTER — DOCUMENTATION (OUTPATIENT)
Dept: CARDIOLOGY | Facility: MEDICAL CENTER | Age: 80
End: 2025-05-28
Payer: MEDICARE

## 2025-05-28 ENCOUNTER — TELEPHONE (OUTPATIENT)
Dept: CARDIOLOGY | Facility: MEDICAL CENTER | Age: 80
End: 2025-05-28
Payer: MEDICARE

## 2025-05-28 NOTE — PROGRESS NOTES
Pre Mitral ROXANNE notification letter electronically faxed to PCP Dr. Penelope Abraham [phone] and primary cardiologist Dr. Genia Martinez [phone]

## 2025-05-28 NOTE — TELEPHONE ENCOUNTER
Valve Conference Plan of Care: 5/28/2025 6/3/2025           Mitral ROXANNE Candidate: yes. Plan for intraop DCCV  General Anesthesia or MAC: GA with RUDDY  Unit: telemetry  Clearance needed prior to procedure: no    Check in time of 0530 6/3/2025.     Pre-op appointment completed: scheduled 5/29/2025    NPO after midnight. Hold vitamins/supplements x7 days, Eliquis x2 days, furosemide/potassium AM of procedure.

## 2025-05-29 ENCOUNTER — TELEPHONE (OUTPATIENT)
Dept: CARDIOLOGY | Facility: MEDICAL CENTER | Age: 80
End: 2025-05-29
Payer: MEDICARE

## 2025-05-29 ENCOUNTER — PRE-ADMISSION TESTING (OUTPATIENT)
Dept: ADMISSIONS | Facility: MEDICAL CENTER | Age: 80
DRG: 267 | End: 2025-05-29
Attending: INTERNAL MEDICINE
Payer: MEDICARE

## 2025-05-29 DIAGNOSIS — I34.0 NONRHEUMATIC MITRAL VALVE REGURGITATION: Primary | ICD-10-CM

## 2025-05-29 RX ORDER — ROSUVASTATIN CALCIUM 40 MG/1
1 TABLET, COATED ORAL DAILY
COMMUNITY
Start: 2025-02-17

## 2025-05-29 RX ORDER — DIGOXIN 125 MCG
125 TABLET ORAL DAILY
Status: ON HOLD | COMMUNITY
End: 2025-06-03

## 2025-05-29 NOTE — TELEPHONE ENCOUNTER
Patient called inquiring if she can continue her PRN Pepcid prior to Mitral ROXANNE. Advised this is ok to continue if needed. No further questions or concerns.

## 2025-05-30 ENCOUNTER — PRE-ADMISSION TESTING (OUTPATIENT)
Dept: ADMISSIONS | Facility: MEDICAL CENTER | Age: 80
DRG: 267 | End: 2025-05-30
Attending: INTERNAL MEDICINE
Payer: MEDICARE

## 2025-05-30 DIAGNOSIS — Z01.812 PRE-OPERATIVE LABORATORY EXAMINATION: Primary | ICD-10-CM

## 2025-05-30 LAB
ABO GROUP BLD: NORMAL
ALBUMIN SERPL BCP-MCNC: 4.3 G/DL (ref 3.2–4.9)
ALBUMIN/GLOB SERPL: 1.4 G/DL
ALP SERPL-CCNC: 88 U/L (ref 30–99)
ALT SERPL-CCNC: 17 U/L (ref 2–50)
ANION GAP SERPL CALC-SCNC: 12 MMOL/L (ref 7–16)
APTT PPP: 31.2 SEC (ref 24.7–36)
AST SERPL-CCNC: 26 U/L (ref 12–45)
BASOPHILS # BLD AUTO: 0.8 % (ref 0–1.8)
BASOPHILS # BLD: 0.07 K/UL (ref 0–0.12)
BILIRUB SERPL-MCNC: 0.5 MG/DL (ref 0.1–1.5)
BLD GP AB SCN SERPL QL: NORMAL
BUN SERPL-MCNC: 29 MG/DL (ref 8–22)
CALCIUM ALBUM COR SERPL-MCNC: 9.3 MG/DL (ref 8.5–10.5)
CALCIUM SERPL-MCNC: 9.5 MG/DL (ref 8.5–10.5)
CHLORIDE SERPL-SCNC: 101 MMOL/L (ref 96–112)
CO2 SERPL-SCNC: 24 MMOL/L (ref 20–33)
CREAT SERPL-MCNC: 1.03 MG/DL (ref 0.5–1.4)
EOSINOPHIL # BLD AUTO: 0.21 K/UL (ref 0–0.51)
EOSINOPHIL NFR BLD: 2.4 % (ref 0–6.9)
ERYTHROCYTE [DISTWIDTH] IN BLOOD BY AUTOMATED COUNT: 47.6 FL (ref 35.9–50)
GFR SERPLBLD CREATININE-BSD FMLA CKD-EPI: 54 ML/MIN/1.73 M 2
GLOBULIN SER CALC-MCNC: 3 G/DL (ref 1.9–3.5)
GLUCOSE SERPL-MCNC: 104 MG/DL (ref 65–99)
HCT VFR BLD AUTO: 42.2 % (ref 37–47)
HGB BLD-MCNC: 13.8 G/DL (ref 12–16)
IMM GRANULOCYTES # BLD AUTO: 0.05 K/UL (ref 0–0.11)
IMM GRANULOCYTES NFR BLD AUTO: 0.6 % (ref 0–0.9)
INR PPP: 1.29 (ref 0.87–1.13)
LYMPHOCYTES # BLD AUTO: 1.55 K/UL (ref 1–4.8)
LYMPHOCYTES NFR BLD: 17.9 % (ref 22–41)
MCH RBC QN AUTO: 28.9 PG (ref 27–33)
MCHC RBC AUTO-ENTMCNC: 32.7 G/DL (ref 32.2–35.5)
MCV RBC AUTO: 88.5 FL (ref 81.4–97.8)
MONOCYTES # BLD AUTO: 0.87 K/UL (ref 0–0.85)
MONOCYTES NFR BLD AUTO: 10 % (ref 0–13.4)
NEUTROPHILS # BLD AUTO: 5.91 K/UL (ref 1.82–7.42)
NEUTROPHILS NFR BLD: 68.3 % (ref 44–72)
NRBC # BLD AUTO: 0 K/UL
NRBC BLD-RTO: 0 /100 WBC (ref 0–0.2)
NT-PROBNP SERPL IA-MCNC: 1415 PG/ML (ref 0–125)
PLATELET # BLD AUTO: 232 K/UL (ref 164–446)
PMV BLD AUTO: 11.5 FL (ref 9–12.9)
POTASSIUM SERPL-SCNC: 4.3 MMOL/L (ref 3.6–5.5)
PROT SERPL-MCNC: 7.3 G/DL (ref 6–8.2)
PROTHROMBIN TIME: 16.2 SEC (ref 12–14.6)
RBC # BLD AUTO: 4.77 M/UL (ref 4.2–5.4)
RH BLD: NORMAL
SODIUM SERPL-SCNC: 137 MMOL/L (ref 135–145)
WBC # BLD AUTO: 8.7 K/UL (ref 4.8–10.8)

## 2025-05-30 PROCEDURE — 86900 BLOOD TYPING SEROLOGIC ABO: CPT

## 2025-05-30 PROCEDURE — 83880 ASSAY OF NATRIURETIC PEPTIDE: CPT

## 2025-05-30 PROCEDURE — 85730 THROMBOPLASTIN TIME PARTIAL: CPT

## 2025-05-30 PROCEDURE — 85025 COMPLETE CBC W/AUTO DIFF WBC: CPT

## 2025-05-30 PROCEDURE — 85610 PROTHROMBIN TIME: CPT

## 2025-05-30 PROCEDURE — 86850 RBC ANTIBODY SCREEN: CPT

## 2025-05-30 PROCEDURE — 36415 COLL VENOUS BLD VENIPUNCTURE: CPT

## 2025-05-30 PROCEDURE — 86901 BLOOD TYPING SEROLOGIC RH(D): CPT

## 2025-05-30 PROCEDURE — 80053 COMPREHEN METABOLIC PANEL: CPT

## 2025-06-03 ENCOUNTER — HOSPITAL ENCOUNTER (INPATIENT)
Facility: MEDICAL CENTER | Age: 80
LOS: 1 days | DRG: 267 | End: 2025-06-04
Attending: INTERNAL MEDICINE | Admitting: INTERNAL MEDICINE
Payer: MEDICARE

## 2025-06-03 ENCOUNTER — ANESTHESIA EVENT (OUTPATIENT)
Dept: SURGERY | Facility: MEDICAL CENTER | Age: 80
DRG: 267 | End: 2025-06-03
Payer: MEDICARE

## 2025-06-03 ENCOUNTER — APPOINTMENT (OUTPATIENT)
Dept: RADIOLOGY | Facility: MEDICAL CENTER | Age: 80
DRG: 267 | End: 2025-06-03
Attending: NURSE PRACTITIONER
Payer: MEDICARE

## 2025-06-03 ENCOUNTER — APPOINTMENT (OUTPATIENT)
Dept: CARDIOLOGY | Facility: MEDICAL CENTER | Age: 80
DRG: 267 | End: 2025-06-03
Attending: ANESTHESIOLOGY
Payer: MEDICARE

## 2025-06-03 ENCOUNTER — ANESTHESIA (OUTPATIENT)
Dept: SURGERY | Facility: MEDICAL CENTER | Age: 80
DRG: 267 | End: 2025-06-03
Payer: MEDICARE

## 2025-06-03 DIAGNOSIS — I48.0 PAF (PAROXYSMAL ATRIAL FIBRILLATION) (HCC): ICD-10-CM

## 2025-06-03 DIAGNOSIS — Z98.890 S/P MITRAL VALVE CLIP IMPLANTATION: Primary | ICD-10-CM

## 2025-06-03 DIAGNOSIS — Z95.818 S/P MITRAL VALVE CLIP IMPLANTATION: Primary | ICD-10-CM

## 2025-06-03 PROBLEM — I34.0 NONRHEUMATIC MITRAL (VALVE) INSUFFICIENCY: Status: RESOLVED | Noted: 2025-05-27 | Resolved: 2025-06-03

## 2025-06-03 LAB
ABO + RH BLD: NORMAL
ACT BLD: 279 SEC (ref 74–137)
ACT BLD: 302 SEC (ref 74–137)
ALBUMIN SERPL BCP-MCNC: 3.5 G/DL (ref 3.2–4.9)
ALBUMIN/GLOB SERPL: 1.7 G/DL
ALP SERPL-CCNC: 73 U/L (ref 30–99)
ALT SERPL-CCNC: 18 U/L (ref 2–50)
ANION GAP SERPL CALC-SCNC: 12 MMOL/L (ref 7–16)
AST SERPL-CCNC: 26 U/L (ref 12–45)
BILIRUB SERPL-MCNC: 0.4 MG/DL (ref 0.1–1.5)
BUN SERPL-MCNC: 29 MG/DL (ref 8–22)
CALCIUM ALBUM COR SERPL-MCNC: 8.9 MG/DL (ref 8.5–10.5)
CALCIUM SERPL-MCNC: 8.5 MG/DL (ref 8.5–10.5)
CHLORIDE SERPL-SCNC: 105 MMOL/L (ref 96–112)
CO2 SERPL-SCNC: 21 MMOL/L (ref 20–33)
CREAT SERPL-MCNC: 0.93 MG/DL (ref 0.5–1.4)
EKG IMPRESSION: NORMAL
ERYTHROCYTE [DISTWIDTH] IN BLOOD BY AUTOMATED COUNT: 49 FL (ref 35.9–50)
GFR SERPLBLD CREATININE-BSD FMLA CKD-EPI: 61 ML/MIN/1.73 M 2
GLOBULIN SER CALC-MCNC: 2.1 G/DL (ref 1.9–3.5)
GLUCOSE SERPL-MCNC: 127 MG/DL (ref 65–99)
HCT VFR BLD AUTO: 38.8 % (ref 37–47)
HGB BLD-MCNC: 12.3 G/DL (ref 12–16)
LV EJECT FRACT  99904: 55
MCH RBC QN AUTO: 28.7 PG (ref 27–33)
MCHC RBC AUTO-ENTMCNC: 31.7 G/DL (ref 32.2–35.5)
MCV RBC AUTO: 90.7 FL (ref 81.4–97.8)
NT-PROBNP SERPL IA-MCNC: 1794 PG/ML (ref 0–125)
PLATELET # BLD AUTO: 183 K/UL (ref 164–446)
PMV BLD AUTO: 11.3 FL (ref 9–12.9)
POTASSIUM SERPL-SCNC: 4.2 MMOL/L (ref 3.6–5.5)
PROT SERPL-MCNC: 5.6 G/DL (ref 6–8.2)
RBC # BLD AUTO: 4.28 M/UL (ref 4.2–5.4)
SODIUM SERPL-SCNC: 138 MMOL/L (ref 135–145)
WBC # BLD AUTO: 7.5 K/UL (ref 4.8–10.8)

## 2025-06-03 PROCEDURE — 700111 HCHG RX REV CODE 636 W/ 250 OVERRIDE (IP): Performed by: INTERNAL MEDICINE

## 2025-06-03 PROCEDURE — 02UG3JZ SUPPLEMENT MITRAL VALVE WITH SYNTHETIC SUBSTITUTE, PERCUTANEOUS APPROACH: ICD-10-PCS | Performed by: INTERNAL MEDICINE

## 2025-06-03 PROCEDURE — 85027 COMPLETE CBC AUTOMATED: CPT

## 2025-06-03 PROCEDURE — A9270 NON-COVERED ITEM OR SERVICE: HCPCS | Performed by: NURSE PRACTITIONER

## 2025-06-03 PROCEDURE — 93312 ECHO TRANSESOPHAGEAL: CPT

## 2025-06-03 PROCEDURE — 86901 BLOOD TYPING SEROLOGIC RH(D): CPT

## 2025-06-03 PROCEDURE — C1769 GUIDE WIRE: HCPCS | Performed by: INTERNAL MEDICINE

## 2025-06-03 PROCEDURE — 700105 HCHG RX REV CODE 258: Performed by: INTERNAL MEDICINE

## 2025-06-03 PROCEDURE — C1760 CLOSURE DEV, VASC: HCPCS | Performed by: INTERNAL MEDICINE

## 2025-06-03 PROCEDURE — 700101 HCHG RX REV CODE 250: Performed by: ANESTHESIOLOGY

## 2025-06-03 PROCEDURE — 160042 HCHG SURGERY MINUTES - EA ADDL 1 MIN LEVEL 5: Performed by: INTERNAL MEDICINE

## 2025-06-03 PROCEDURE — 80053 COMPREHEN METABOLIC PANEL: CPT

## 2025-06-03 PROCEDURE — 5A2204Z RESTORATION OF CARDIAC RHYTHM, SINGLE: ICD-10-PCS | Performed by: INTERNAL MEDICINE

## 2025-06-03 PROCEDURE — 160015 HCHG STAT PREOP MINUTES: Performed by: INTERNAL MEDICINE

## 2025-06-03 PROCEDURE — C1751 CATH, INF, PER/CENT/MIDLINE: HCPCS | Performed by: INTERNAL MEDICINE

## 2025-06-03 PROCEDURE — 700111 HCHG RX REV CODE 636 W/ 250 OVERRIDE (IP): Mod: JZ | Performed by: NURSE PRACTITIONER

## 2025-06-03 PROCEDURE — 85347 COAGULATION TIME ACTIVATED: CPT

## 2025-06-03 PROCEDURE — 700102 HCHG RX REV CODE 250 W/ 637 OVERRIDE(OP): Performed by: NURSE PRACTITIONER

## 2025-06-03 PROCEDURE — 160002 HCHG RECOVERY MINUTES (STAT): Performed by: INTERNAL MEDICINE

## 2025-06-03 PROCEDURE — 93005 ELECTROCARDIOGRAM TRACING: CPT | Mod: TC | Performed by: NURSE PRACTITIONER

## 2025-06-03 PROCEDURE — 93010 ELECTROCARDIOGRAM REPORT: CPT | Performed by: INTERNAL MEDICINE

## 2025-06-03 PROCEDURE — 71045 X-RAY EXAM CHEST 1 VIEW: CPT

## 2025-06-03 PROCEDURE — 83880 ASSAY OF NATRIURETIC PEPTIDE: CPT

## 2025-06-03 PROCEDURE — 160031 HCHG SURGERY MINUTES - 1ST 30 MINS LEVEL 5: Performed by: INTERNAL MEDICINE

## 2025-06-03 PROCEDURE — 86900 BLOOD TYPING SEROLOGIC ABO: CPT

## 2025-06-03 PROCEDURE — 700101 HCHG RX REV CODE 250: Performed by: INTERNAL MEDICINE

## 2025-06-03 PROCEDURE — C1883 ADAPT/EXT, PACING/NEURO LEAD: HCPCS | Performed by: INTERNAL MEDICINE

## 2025-06-03 PROCEDURE — B24BZZ4 ULTRASONOGRAPHY OF HEART WITH AORTA, TRANSESOPHAGEAL: ICD-10-PCS | Performed by: INTERNAL MEDICINE

## 2025-06-03 PROCEDURE — C1887 CATHETER, GUIDING: HCPCS | Performed by: INTERNAL MEDICINE

## 2025-06-03 PROCEDURE — 36415 COLL VENOUS BLD VENIPUNCTURE: CPT

## 2025-06-03 PROCEDURE — 33418 REPAIR TCAT MITRAL VALVE: CPT | Mod: Q0 | Performed by: INTERNAL MEDICINE

## 2025-06-03 PROCEDURE — 160048 HCHG OR STATISTICAL LEVEL 1-5: Performed by: INTERNAL MEDICINE

## 2025-06-03 PROCEDURE — 700105 HCHG RX REV CODE 258: Performed by: NURSE PRACTITIONER

## 2025-06-03 PROCEDURE — 770020 HCHG ROOM/CARE - TELE (206)

## 2025-06-03 PROCEDURE — 160009 HCHG ANES TIME/MIN: Performed by: INTERNAL MEDICINE

## 2025-06-03 PROCEDURE — C1894 INTRO/SHEATH, NON-LASER: HCPCS | Performed by: INTERNAL MEDICINE

## 2025-06-03 PROCEDURE — 700111 HCHG RX REV CODE 636 W/ 250 OVERRIDE (IP): Performed by: ANESTHESIOLOGY

## 2025-06-03 PROCEDURE — 160035 HCHG PACU - 1ST 60 MINS PHASE I: Performed by: INTERNAL MEDICINE

## 2025-06-03 DEVICE — KIT MITRACLIP G4 SYSTEM CATHETER SGC0701 (1/EA): Type: IMPLANTABLE DEVICE | Status: FUNCTIONAL

## 2025-06-03 RX ORDER — SODIUM CHLORIDE, SODIUM LACTATE, POTASSIUM CHLORIDE, CALCIUM CHLORIDE 600; 310; 30; 20 MG/100ML; MG/100ML; MG/100ML; MG/100ML
INJECTION, SOLUTION INTRAVENOUS CONTINUOUS
Status: DISCONTINUED | OUTPATIENT
Start: 2025-06-03 | End: 2025-06-03

## 2025-06-03 RX ORDER — DIPHENHYDRAMINE HYDROCHLORIDE 50 MG/ML
12.5 INJECTION, SOLUTION INTRAMUSCULAR; INTRAVENOUS
Status: DISCONTINUED | OUTPATIENT
Start: 2025-06-03 | End: 2025-06-03 | Stop reason: HOSPADM

## 2025-06-03 RX ORDER — LEVOTHYROXINE SODIUM 88 UG/1
88 TABLET ORAL
Status: DISCONTINUED | OUTPATIENT
Start: 2025-06-04 | End: 2025-06-04 | Stop reason: HOSPADM

## 2025-06-03 RX ORDER — ONDANSETRON 2 MG/ML
4 INJECTION INTRAMUSCULAR; INTRAVENOUS
Status: DISCONTINUED | OUTPATIENT
Start: 2025-06-03 | End: 2025-06-03 | Stop reason: HOSPADM

## 2025-06-03 RX ORDER — ALBUTEROL SULFATE 5 MG/ML
2.5 SOLUTION RESPIRATORY (INHALATION)
Status: DISCONTINUED | OUTPATIENT
Start: 2025-06-03 | End: 2025-06-03 | Stop reason: HOSPADM

## 2025-06-03 RX ORDER — OXYCODONE HCL 5 MG/5 ML
10 SOLUTION, ORAL ORAL
Status: DISCONTINUED | OUTPATIENT
Start: 2025-06-03 | End: 2025-06-03 | Stop reason: HOSPADM

## 2025-06-03 RX ORDER — ACETAMINOPHEN 325 MG/1
650 TABLET ORAL EVERY 6 HOURS PRN
Status: DISCONTINUED | OUTPATIENT
Start: 2025-06-03 | End: 2025-06-04 | Stop reason: HOSPADM

## 2025-06-03 RX ORDER — POLYETHYLENE GLYCOL 3350 17 G/17G
1 POWDER, FOR SOLUTION ORAL 2 TIMES DAILY PRN
Status: DISCONTINUED | OUTPATIENT
Start: 2025-06-03 | End: 2025-06-04 | Stop reason: HOSPADM

## 2025-06-03 RX ORDER — ACETAMINOPHEN 500 MG
1000 TABLET ORAL EVERY 6 HOURS PRN
COMMUNITY

## 2025-06-03 RX ORDER — AMIODARONE HYDROCHLORIDE 200 MG/1
200 TABLET ORAL TWICE DAILY
Status: DISCONTINUED | OUTPATIENT
Start: 2025-06-03 | End: 2025-06-04 | Stop reason: HOSPADM

## 2025-06-03 RX ORDER — ONDANSETRON 2 MG/ML
4 INJECTION INTRAMUSCULAR; INTRAVENOUS EVERY 4 HOURS PRN
Status: DISCONTINUED | OUTPATIENT
Start: 2025-06-03 | End: 2025-06-04 | Stop reason: HOSPADM

## 2025-06-03 RX ORDER — BUPIVACAINE HYDROCHLORIDE 2.5 MG/ML
INJECTION, SOLUTION EPIDURAL; INFILTRATION; INTRACAUDAL; PERINEURAL
Status: DISCONTINUED | OUTPATIENT
Start: 2025-06-03 | End: 2025-06-03 | Stop reason: HOSPADM

## 2025-06-03 RX ORDER — SODIUM CHLORIDE 9 MG/ML
INJECTION, SOLUTION INTRAVENOUS CONTINUOUS
Status: ACTIVE | OUTPATIENT
Start: 2025-06-03 | End: 2025-06-03

## 2025-06-03 RX ORDER — HYDROMORPHONE HYDROCHLORIDE 1 MG/ML
0.4 INJECTION, SOLUTION INTRAMUSCULAR; INTRAVENOUS; SUBCUTANEOUS
Status: DISCONTINUED | OUTPATIENT
Start: 2025-06-03 | End: 2025-06-03 | Stop reason: HOSPADM

## 2025-06-03 RX ORDER — OXYCODONE HCL 5 MG/5 ML
5 SOLUTION, ORAL ORAL
Status: DISCONTINUED | OUTPATIENT
Start: 2025-06-03 | End: 2025-06-03 | Stop reason: HOSPADM

## 2025-06-03 RX ORDER — DIPHENHYDRAMINE HCL 25 MG
25 TABLET ORAL NIGHTLY PRN
Status: DISCONTINUED | OUTPATIENT
Start: 2025-06-03 | End: 2025-06-04 | Stop reason: HOSPADM

## 2025-06-03 RX ORDER — LIDOCAINE HYDROCHLORIDE 40 MG/ML
SOLUTION TOPICAL PRN
Status: DISCONTINUED | OUTPATIENT
Start: 2025-06-03 | End: 2025-06-03 | Stop reason: HOSPADM

## 2025-06-03 RX ORDER — FAMOTIDINE 20 MG/1
20 TABLET, FILM COATED ORAL EVERY EVENING
Status: DISCONTINUED | OUTPATIENT
Start: 2025-06-03 | End: 2025-06-04 | Stop reason: HOSPADM

## 2025-06-03 RX ORDER — CEFAZOLIN SODIUM 1 G/3ML
INJECTION, POWDER, FOR SOLUTION INTRAMUSCULAR; INTRAVENOUS PRN
Status: DISCONTINUED | OUTPATIENT
Start: 2025-06-03 | End: 2025-06-03 | Stop reason: SURG

## 2025-06-03 RX ORDER — HALOPERIDOL 5 MG/ML
1 INJECTION INTRAMUSCULAR
Status: DISCONTINUED | OUTPATIENT
Start: 2025-06-03 | End: 2025-06-03 | Stop reason: HOSPADM

## 2025-06-03 RX ORDER — HYDROMORPHONE HYDROCHLORIDE 1 MG/ML
0.2 INJECTION, SOLUTION INTRAMUSCULAR; INTRAVENOUS; SUBCUTANEOUS
Status: DISCONTINUED | OUTPATIENT
Start: 2025-06-03 | End: 2025-06-03 | Stop reason: HOSPADM

## 2025-06-03 RX ORDER — LIDOCAINE HYDROCHLORIDE 20 MG/ML
INJECTION, SOLUTION INFILTRATION; PERINEURAL
Status: DISCONTINUED | OUTPATIENT
Start: 2025-06-03 | End: 2025-06-03 | Stop reason: HOSPADM

## 2025-06-03 RX ORDER — AMOXICILLIN 250 MG
1 CAPSULE ORAL NIGHTLY
Status: DISCONTINUED | OUTPATIENT
Start: 2025-06-03 | End: 2025-06-04 | Stop reason: HOSPADM

## 2025-06-03 RX ORDER — MEPERIDINE HYDROCHLORIDE 50 MG/ML
6.25 INJECTION INTRAMUSCULAR; INTRAVENOUS; SUBCUTANEOUS
Status: DISCONTINUED | OUTPATIENT
Start: 2025-06-03 | End: 2025-06-03 | Stop reason: HOSPADM

## 2025-06-03 RX ORDER — POTASSIUM CHLORIDE 1500 MG/1
10 TABLET, EXTENDED RELEASE ORAL DAILY
Status: DISCONTINUED | OUTPATIENT
Start: 2025-06-03 | End: 2025-06-04 | Stop reason: HOSPADM

## 2025-06-03 RX ORDER — HEPARIN SODIUM 1000 [USP'U]/ML
INJECTION, SOLUTION INTRAVENOUS; SUBCUTANEOUS PRN
Status: DISCONTINUED | OUTPATIENT
Start: 2025-06-03 | End: 2025-06-03 | Stop reason: SURG

## 2025-06-03 RX ORDER — METOPROLOL TARTRATE 25 MG/1
25 TABLET, FILM COATED ORAL 2 TIMES DAILY
Status: DISCONTINUED | OUTPATIENT
Start: 2025-06-03 | End: 2025-06-04 | Stop reason: HOSPADM

## 2025-06-03 RX ORDER — HYDRALAZINE HYDROCHLORIDE 20 MG/ML
10 INJECTION INTRAMUSCULAR; INTRAVENOUS
Status: DISCONTINUED | OUTPATIENT
Start: 2025-06-03 | End: 2025-06-04 | Stop reason: HOSPADM

## 2025-06-03 RX ORDER — FAMOTIDINE 20 MG/1
20 TABLET, FILM COATED ORAL
COMMUNITY

## 2025-06-03 RX ORDER — DEXAMETHASONE SODIUM PHOSPHATE 4 MG/ML
INJECTION, SOLUTION INTRA-ARTICULAR; INTRALESIONAL; INTRAMUSCULAR; INTRAVENOUS; SOFT TISSUE PRN
Status: DISCONTINUED | OUTPATIENT
Start: 2025-06-03 | End: 2025-06-03 | Stop reason: SURG

## 2025-06-03 RX ORDER — ROCURONIUM BROMIDE 10 MG/ML
INJECTION, SOLUTION INTRAVENOUS PRN
Status: DISCONTINUED | OUTPATIENT
Start: 2025-06-03 | End: 2025-06-03 | Stop reason: HOSPADM

## 2025-06-03 RX ORDER — HYDROMORPHONE HYDROCHLORIDE 1 MG/ML
0.1 INJECTION, SOLUTION INTRAMUSCULAR; INTRAVENOUS; SUBCUTANEOUS
Status: DISCONTINUED | OUTPATIENT
Start: 2025-06-03 | End: 2025-06-03 | Stop reason: HOSPADM

## 2025-06-03 RX ORDER — FUROSEMIDE 10 MG/ML
20 INJECTION INTRAMUSCULAR; INTRAVENOUS DAILY
Status: DISCONTINUED | OUTPATIENT
Start: 2025-06-03 | End: 2025-06-04 | Stop reason: HOSPADM

## 2025-06-03 RX ORDER — POTASSIUM CHLORIDE 750 MG/1
10 TABLET, EXTENDED RELEASE ORAL DAILY
Status: ON HOLD | COMMUNITY
End: 2025-06-03

## 2025-06-03 RX ORDER — AMOXICILLIN 250 MG
1 CAPSULE ORAL
Status: DISCONTINUED | OUTPATIENT
Start: 2025-06-03 | End: 2025-06-04 | Stop reason: HOSPADM

## 2025-06-03 RX ORDER — ONDANSETRON 2 MG/ML
INJECTION INTRAMUSCULAR; INTRAVENOUS PRN
Status: DISCONTINUED | OUTPATIENT
Start: 2025-06-03 | End: 2025-06-03 | Stop reason: SURG

## 2025-06-03 RX ORDER — ROSUVASTATIN CALCIUM 20 MG/1
40 TABLET, COATED ORAL DAILY
Status: DISCONTINUED | OUTPATIENT
Start: 2025-06-03 | End: 2025-06-04 | Stop reason: HOSPADM

## 2025-06-03 RX ORDER — BISACODYL 10 MG
10 SUPPOSITORY, RECTAL RECTAL
Status: DISCONTINUED | OUTPATIENT
Start: 2025-06-03 | End: 2025-06-04 | Stop reason: HOSPADM

## 2025-06-03 RX ORDER — DIPHENHYDRAMINE HYDROCHLORIDE 50 MG/ML
25 INJECTION, SOLUTION INTRAMUSCULAR; INTRAVENOUS EVERY 6 HOURS PRN
Status: DISCONTINUED | OUTPATIENT
Start: 2025-06-03 | End: 2025-06-04 | Stop reason: HOSPADM

## 2025-06-03 RX ORDER — SODIUM PHOSPHATE,MONO-DIBASIC 19G-7G/118
1 ENEMA (ML) RECTAL
Status: DISCONTINUED | OUTPATIENT
Start: 2025-06-03 | End: 2025-06-04 | Stop reason: HOSPADM

## 2025-06-03 RX ORDER — DOCUSATE SODIUM 100 MG/1
100 CAPSULE, LIQUID FILLED ORAL 2 TIMES DAILY
Status: DISCONTINUED | OUTPATIENT
Start: 2025-06-03 | End: 2025-06-04 | Stop reason: HOSPADM

## 2025-06-03 RX ADMIN — ROCURONIUM BROMIDE 50 MG: 10 INJECTION INTRAVENOUS at 07:35

## 2025-06-03 RX ADMIN — DEXAMETHASONE SODIUM PHOSPHATE 4 MG: 4 INJECTION INTRA-ARTICULAR; INTRALESIONAL; INTRAMUSCULAR; INTRAVENOUS; SOFT TISSUE at 07:55

## 2025-06-03 RX ADMIN — HEPARIN SODIUM 10000 UNITS: 1000 INJECTION, SOLUTION INTRAVENOUS; SUBCUTANEOUS at 08:02

## 2025-06-03 RX ADMIN — FAMOTIDINE 20 MG: 20 TABLET, FILM COATED ORAL at 18:54

## 2025-06-03 RX ADMIN — APIXABAN 5 MG: 5 TABLET, FILM COATED ORAL at 18:54

## 2025-06-03 RX ADMIN — SODIUM CHLORIDE: 9 INJECTION, SOLUTION INTRAVENOUS at 12:55

## 2025-06-03 RX ADMIN — SUGAMMADEX 200 MG: 100 INJECTION, SOLUTION INTRAVENOUS at 09:09

## 2025-06-03 RX ADMIN — POTASSIUM CHLORIDE 10 MEQ: 1500 TABLET, EXTENDED RELEASE ORAL at 12:54

## 2025-06-03 RX ADMIN — METOPROLOL TARTRATE 25 MG: 25 TABLET, FILM COATED ORAL at 18:54

## 2025-06-03 RX ADMIN — LIDOCAINE HYDROCHLORIDE 4 ML: 40 SOLUTION TOPICAL at 07:36

## 2025-06-03 RX ADMIN — AMIODARONE HYDROCHLORIDE 200 MG: 200 TABLET ORAL at 16:35

## 2025-06-03 RX ADMIN — ROSUVASTATIN CALCIUM 40 MG: 20 TABLET, FILM COATED ORAL at 12:55

## 2025-06-03 RX ADMIN — ONDANSETRON 4 MG: 2 INJECTION INTRAMUSCULAR; INTRAVENOUS at 07:55

## 2025-06-03 RX ADMIN — ACETAMINOPHEN 650 MG: 325 TABLET ORAL at 23:32

## 2025-06-03 RX ADMIN — PROPOFOL 150 MG: 10 INJECTION, EMULSION INTRAVENOUS at 07:35

## 2025-06-03 RX ADMIN — FUROSEMIDE 20 MG: 10 INJECTION, SOLUTION INTRAVENOUS at 12:55

## 2025-06-03 RX ADMIN — SODIUM CHLORIDE, POTASSIUM CHLORIDE, SODIUM LACTATE AND CALCIUM CHLORIDE: 600; 310; 30; 20 INJECTION, SOLUTION INTRAVENOUS at 07:30

## 2025-06-03 RX ADMIN — CEFAZOLIN 2 G: 1 INJECTION, POWDER, FOR SOLUTION INTRAMUSCULAR; INTRAVENOUS at 07:41

## 2025-06-03 SDOH — ECONOMIC STABILITY: TRANSPORTATION INSECURITY
IN THE PAST 12 MONTHS, HAS THE LACK OF TRANSPORTATION KEPT YOU FROM MEDICAL APPOINTMENTS OR FROM GETTING MEDICATIONS?: NO

## 2025-06-03 SDOH — ECONOMIC STABILITY: TRANSPORTATION INSECURITY
IN THE PAST 12 MONTHS, HAS LACK OF RELIABLE TRANSPORTATION KEPT YOU FROM MEDICAL APPOINTMENTS, MEETINGS, WORK OR FROM GETTING THINGS NEEDED FOR DAILY LIVING?: NO

## 2025-06-03 ASSESSMENT — CHA2DS2 SCORE
HYPERTENSION: YES
SEX: FEMALE
AGE 65 TO 74: NO
AGE 75 OR GREATER: YES
DIABETES: NO
CHF OR LEFT VENTRICULAR DYSFUNCTION: NO
VASCULAR DISEASE: YES
CHA2DS2 VASC SCORE: 5
PRIOR STROKE OR TIA OR THROMBOEMBOLISM: NO

## 2025-06-03 ASSESSMENT — PATIENT HEALTH QUESTIONNAIRE - PHQ9
2. FEELING DOWN, DEPRESSED, IRRITABLE, OR HOPELESS: NOT AT ALL
SUM OF ALL RESPONSES TO PHQ9 QUESTIONS 1 AND 2: 0
1. LITTLE INTEREST OR PLEASURE IN DOING THINGS: NOT AT ALL
2. FEELING DOWN, DEPRESSED, IRRITABLE, OR HOPELESS: NOT AT ALL
1. LITTLE INTEREST OR PLEASURE IN DOING THINGS: NOT AT ALL
1. LITTLE INTEREST OR PLEASURE IN DOING THINGS: NOT AT ALL
SUM OF ALL RESPONSES TO PHQ9 QUESTIONS 1 AND 2: 0
2. FEELING DOWN, DEPRESSED, IRRITABLE, OR HOPELESS: NOT AT ALL
SUM OF ALL RESPONSES TO PHQ9 QUESTIONS 1 AND 2: 0

## 2025-06-03 ASSESSMENT — FIBROSIS 4 INDEX
FIB4 SCORE: 2.75
FIB4 SCORE: 2.75
FIB4 SCORE: 2.23

## 2025-06-03 ASSESSMENT — SOCIAL DETERMINANTS OF HEALTH (SDOH)
IN THE PAST 12 MONTHS, HAS THE ELECTRIC, GAS, OIL, OR WATER COMPANY THREATENED TO SHUT OFF SERVICE IN YOUR HOME?: NO
WITHIN THE PAST 12 MONTHS, YOU WORRIED THAT YOUR FOOD WOULD RUN OUT BEFORE YOU GOT THE MONEY TO BUY MORE: NEVER TRUE
WITHIN THE PAST 12 MONTHS, THE FOOD YOU BOUGHT JUST DIDN'T LAST AND YOU DIDN'T HAVE MONEY TO GET MORE: NEVER TRUE

## 2025-06-03 ASSESSMENT — PAIN DESCRIPTION - PAIN TYPE: TYPE: ACUTE PAIN

## 2025-06-03 NOTE — OR NURSING
0915 Pt arrived to PACU with Anesthesiologist and OR RN. OPA in place. Even, unlabored respirations. VSS. Right groin site CDI and soft. Right pedal pulse 2+. 4 hours strict flat bedrest until 1315.    0930 OPA d/c'd. AAOx4.  Denies pain. Denies nausea.     0945 POC update given to Kin, brother, over the phone. All questions answered. Belongings with family.    1054 Right radial art line removed. MP held for 5 minutes. Gauze and tegaderm dressing placed.     1100 Pt meets floor criteria. Report called to ELADIO Nolen on Tele 7. Pt transported to T7- with RN. Chart with patient.

## 2025-06-03 NOTE — PROGRESS NOTES
4 Eyes Skin Assessment Completed by CROW CROSS RN and ELADIO FOWLER.    Skin assessment is primarily focused on high risk bony prominences. Pay special attention to skin beneath and around medical devices, high risk bony prominences, skin to skin areas and areas where the patient lacks sensation to feel pain and areas where the patient previously had breakdown.     Head (Occipital):  WDL   Ears (Under Medical Devices): WDL   Nose (Under Medical Devices): WDL   Mouth:  WDL   Neck: WDL   Breast/Chest:  WDL   Shoulder Blades:  WDL   Spine:   WDL   (R) Arm/Elbow/Hand: WDL   (L) Arm/Elbow/Hand: WDL   Abdomen: WDL   Pannus/Groin:  R GROIN CATH SITE WDL   Sacrum/Coccyx:   WDL   (R) Ischial Tuberosity (Sit Bones):  WDL   (L) Ischial Tuberosity (Sit Bones):  WDL   (R) Leg:  WDL   (L) Leg:  WDL   (R) Heel:  WDL   (R) Foot/Toe: WDL   (L) Heel: WDL   (L) Foot/Toe:  WDL       DEVICES IN USE:   Respiratory Devices:  Nasal cannula  Feeding Devices:  N/A   Lines & BP Monitoring Devices:  Peripheral IV    Orthopedic Devices:  N/A  Miscellaneous Devices:  Purewick    PROTOCOL INTERVENTIONS:   Standard/Trauma Bed:  Already in place    WOUND PHOTOS:   N/A no wounds identified    WOUND CONSULT:   N/A, no advanced wound care needs identified

## 2025-06-03 NOTE — CARE PLAN
The patient is Stable - Low risk of patient condition declining or worsening         Progress made toward(s) clinical / shift goals:  MONITOR GROIN SITE  Problem: Knowledge Deficit - Standard  Goal: Patient and family/care givers will demonstrate understanding of plan of care, disease process/condition, diagnostic tests and medications  Outcome: Progressing     Problem: Pain - Standard  Goal: Alleviation of pain or a reduction in pain to the patient’s comfort goal  Outcome: Progressing     Problem: Communication  Goal: The ability to communicate needs accurately and effectively will improve  Outcome: Progressing     Problem: Safety  Goal: Will remain free from injury  Outcome: Progressing  Goal: Will remain free from falls  Outcome: Progressing     Problem: Pain Management  Goal: Pain level will decrease to patient's comfort goal  Outcome: Progressing     Problem: Infection  Goal: Will remain free from infection  Outcome: Progressing     Problem: Venous Thromboembolism (VTW)/Deep Vein Thrombosis (DVT) Prevention:  Goal: Patient will participate in Venous Thrombosis (VTE)/Deep Vein Thrombosis (DVT)Prevention Measures  Outcome: Progressing     Problem: Psychosocial Needs:  Goal: Level of anxiety will decrease  Outcome: Progressing     Problem: Fluid Volume:  Goal: Will maintain balanced intake and output  Outcome: Progressing     Problem: Respiratory:  Goal: Respiratory status will improve  Outcome: Progressing     Problem: Bowel/Gastric:  Goal: Normal bowel function is maintained or improved  Outcome: Progressing  Goal: Will not experience complications related to bowel motility  Outcome: Progressing     Problem: Urinary Elimination:  Goal: Ability to reestablish a normal urinary elimination pattern will improve  Outcome: Progressing     Problem: Skin Integrity  Goal: Risk for impaired skin integrity will decrease  Outcome: Progressing     Problem: Mobility  Goal: Risk for activity intolerance will decrease  Outcome:  Progressing     Problem: Medication  Goal: Compliance with prescribed medication will improve  Outcome: Progressing     Problem: Knowledge Deficit  Goal: Knowledge of disease process/condition, treatment plan, diagnostic tests, and medications will improve  Outcome: Progressing  Goal: Knowledge of the prescribed therapeutic regimen will improve  Outcome: Progressing     Problem: Discharge Barriers/Planning  Goal: Patient's continuum of care needs will be met  Outcome: Progressing       Patient is not progressing towards the following goals:

## 2025-06-03 NOTE — PROCEDURES
DATE OF PROCEDURE: 6/3/25    REFERRING PHYSICIAN:     PROCEDURES:  1. Transcatheter mitral valve edge to edge repair (ROXANNE or Mitraclip)  2. Cardioversion    PRE PROCEDURAL DIAGNOSIS:  Severe symptomatic functional mitral regurgitation NYHA III C  and high surgical risk.    POST PROCEDURAL DIAGNOSIS:  Successful transcatheter mitral valve edge to edge repair using 4+ clip, reduction of mitral regurgitation from 4+ to 1-2+    Surgeons: Dr. Shore (interventional cardiologist)       DESCRIPTION OF PROCEDURE:  After informed consent was signed by the   patient, the patient was brought into the OR 19.  Bilateral groin was prepped and draped in   usual sterile manner.  The initial timeout was performed.     A 6-Iraqi venous sheath was placed in the right femoral vein by Modified Seldinger   technique under ultrasound guidance. A PerClose devices was delivered after dilatation of skin track.  An 8.5-Iraqi TorFlex venous sheath was advanced. Half dose IV heparin was given. A Harcourt   needle was inserted into the catheter and both the needle and the sheath were placed   in the right atrium. Under transesophageal echocardiogram guidance, the Harcourt needle   was used to puncture the septum and the catheter was advanced into the left atrium.   Rest of the half dose Heparin was given. A Protrack pigtail wire was positioned into the left   atrium. The sheath was removed and the femoral access site was dilated with   multiple dialators. The 22/24-Iraqi steerable guide catheter handle was inserted into  the left atrium under transesophageal echocardiogram guidance and the dilator was   removed.  A NTW Clip attached to the delivery catheter handle was inserted in to   the left atrium. Again, under transesophageal echocardiogram guidance, the clip was   opened, advanced into the left ventricle and pulled up to the mitral valve leaflets at the   A2/P2 position. The leaflets were grasped and the clip was partially  closed.   The severity of mitral regurgitation and the mitral valve gradient were measured.   There was residual lateral jet, We tried to move   Clip laterally but large medical jet opened up. Clip was moved back to central A2/P2 and grasped leaflets again.  The clip was then closed fully and released.The delivery catheter was removed.    The mitral regurgitation was reduced from 4+ to 1-2+, the mean gradient was 5 mmHg.  Second clip could not be placed due to high gradient.      The patient tolerated the procedure well. At the end of procedure hemodynamics were   again obtained. The steerable guide catheter was removed and the right femoral venous   access site was closed via PerClose closure.  The patient was then extubated and transferred to PACU in stable condition.      RECOMMENDATION: Guideline directed medical therapy.

## 2025-06-03 NOTE — ANESTHESIA PROCEDURE NOTES
RUDDY    Date/Time: 6/3/2025 7:43 AM    Performed by: Peter Bullock M.D.  Authorized by: Peter Bullock M.D.    Start Time:6/3/2025 7:43 AM  Preanesthetic Checklist: patient identified, IV checked, site marked, risks and benefits discussed, surgical consent, monitors and equipment checked, pre-op evaluation and timeout performed    Indication for RUDDY: diagnostic   Patient Location: OR  Intubated: Yes  Bite Block: Yes  Heart Visualized: Yes  Insertion: atraumatic    **See FULL RUDDY report in patient's chart via CV Synapse**

## 2025-06-03 NOTE — ANESTHESIA PREPROCEDURE EVALUATION
" Case: [id number] Date/Time: 06/03/25 0715    Procedures:       ECHOCARDIOGRAM, TRANSESOPHAGEAL, INTRAOPERATIVE (Throat)      PROCEDURE, MITRAL VALVE, TRANSCATHETER (Right: Groin)    Anesthesia type: General    Pre-op diagnosis: SEVERE MITRAL REGURGITATION    Location: [place] OR 19 / SURGERY [facility]    Surgeons: Faraz Shore M.D.            Relevant Problems   ANESTHESIA   (positive) JAVIER (obstructive sleep apnea)      CARDIAC   (positive) Nonrheumatic mitral (valve) insufficiency   (positive) PAF (paroxysmal atrial fibrillation) (HCC)   (positive) Primary hypertension      ENDO   (positive) Hypothyroid     BP (!) 198/99 Comment: MD aware.  Pulse 82   Temp (!) 35.8 °C (96.4 °F) (Temporal)   Resp 17   Ht 1.626 m (5' 4\")   Wt 80.6 kg (177 lb 11.1 oz)   SpO2 95%   BMI 30.50 kg/m²       Physical Exam    Airway   Mallampati: II  TM distance: >3 FB  Neck ROM: full       Cardiovascular - normal exam  Rhythm: regular  Rate: normal    (-) murmur     Dental - normal exam           Pulmonary - normal examBreath sounds clear to auscultation     Abdominal    Neurological - normal exam                   Anesthesia Plan    ASA 4       Plan - general       Airway plan will be ETT  RUDDY Planned        Induction: intravenous    Postoperative Plan: Postoperative administration of opioids is intended.    Pertinent diagnostic labs and testing reviewed    Informed Consent:    Anesthetic plan and risks discussed with patient.    Use of blood products discussed with: patient whom consented to blood products.           "

## 2025-06-03 NOTE — ANESTHESIA PROCEDURE NOTES
Arterial Line    Performed by: Peter Bullock M.D.  Authorized by: Peter Bullock M.D.    Start Time:  6/3/2025 7:39 AM  End Time:  6/3/2025 7:41 AM  Localization: surface landmarks    Patient Location:  OR  Indication: continuous blood pressure monitoring        Catheter Size:  20 G  Seldinger Technique?: Yes    Laterality:  Right  Site:  Radial artery  Line Secured:  Antimicrobial disc, tape and transparent dressing  Events: patient tolerated procedure well with no complications

## 2025-06-03 NOTE — ANESTHESIA PROCEDURE NOTES
Airway    Date/Time: 6/3/2025 7:36 AM    Performed by: Peter Bullock M.D.  Authorized by: Peter Bullock M.D.    Location:  OR  Urgency:  Elective  Difficult Airway: No    Indications for Airway Management:  Anesthesia      Spontaneous Ventilation: absent    Sedation Level:  Deep  Preoxygenated: Yes    Patient Position:  Sniffing  Mask Difficulty Assessment:  0 - not attempted  Final Airway Type:  Endotracheal airway  Final Endotracheal Airway:  ETT  Cuffed: Yes    Technique Used for Successful ETT Placement:  Direct laryngoscopy    Insertion Site:  Oral  Blade Type:  Guera  Laryngoscope Blade/Videolaryngoscope Blade Size:  3  ETT Size (mm):  6.5  Measured from:  Teeth  ETT to Teeth (cm):  21  Placement Verified by: auscultation and capnometry    Cormack-Lehane Classification:  Grade I - full view of glottis  Number of Attempts at Approach:  1

## 2025-06-03 NOTE — PROGRESS NOTES
Medication history reviewed with PT at bedside    [facility] is complete per PT reporting    Allergies reviewed.     Patient denies any outpatient antibiotics in the last 30 days.     Patient is taking Eliquis 5mg. Last dose was 05/31/2025 in AM    Dispense history is available in Epic.    Preferred pharmacy for this encounter - [facility] on [address] ([phone])

## 2025-06-03 NOTE — ANESTHESIA TIME REPORT
Anesthesia Start and Stop Event Times       Date Time Event    6/3/2025 0713 Ready for Procedure     0730 Anesthesia Start     0916 Anesthesia Stop          Responsible Staff  06/03/25      Name Role Begin End    Peter Bullock M.D. Anesth 0730 0916          Overtime Reason:  no overtime (within assigned shift)    Comments:

## 2025-06-03 NOTE — ANESTHESIA POSTPROCEDURE EVALUATION
Patient: Debbie Tim Fee    Procedure Summary       Date: 06/03/25 Room / Location: [place] [address] / SURGERY [facility]    Anesthesia Start: 0730 Anesthesia Stop: 0916    Procedures:       ECHOCARDIOGRAM, TRANSESOPHAGEAL, INTRAOPERATIVE (Throat)      PROCEDURE, MITRAL VALVE, TRANSCATHETER (Right: Groin) Diagnosis: (SEVERE MITRAL REGURGITATION)    Surgeons: Faraz Shore M.D. Responsible Provider: Peter Bullock M.D.    Anesthesia Type: general ASA Status: 4            Final Anesthesia Type: general  Last vitals  BP   Blood Pressure : (!) 157/86, Arterial BP: 155/79    Temp   36.2 °C (97.2 °F)    Pulse   62   Resp   16    SpO2   96 %      Anesthesia Post Evaluation    Patient location during evaluation: PACU  Patient participation: complete - patient participated  Level of consciousness: awake and alert    Airway patency: patent  Anesthetic complications: no  Cardiovascular status: hemodynamically stable  Respiratory status: face mask    PONV: none          There were no known notable events for this encounter.     Nurse Pain Score: 0 (NPRS)

## 2025-06-04 ENCOUNTER — TELEPHONE (OUTPATIENT)
Dept: CARDIOLOGY | Facility: MEDICAL CENTER | Age: 80
End: 2025-06-04
Payer: MEDICARE

## 2025-06-04 ENCOUNTER — PHARMACY VISIT (OUTPATIENT)
Dept: PHARMACY | Facility: MEDICAL CENTER | Age: 80
End: 2025-06-04
Payer: COMMERCIAL

## 2025-06-04 ENCOUNTER — APPOINTMENT (OUTPATIENT)
Dept: RADIOLOGY | Facility: MEDICAL CENTER | Age: 80
DRG: 267 | End: 2025-06-04
Attending: NURSE PRACTITIONER
Payer: MEDICARE

## 2025-06-04 VITALS
WEIGHT: 179.9 LBS | SYSTOLIC BLOOD PRESSURE: 121 MMHG | DIASTOLIC BLOOD PRESSURE: 69 MMHG | RESPIRATION RATE: 18 BRPM | OXYGEN SATURATION: 95 % | TEMPERATURE: 97 F | HEIGHT: 64 IN | BODY MASS INDEX: 30.71 KG/M2 | HEART RATE: 80 BPM

## 2025-06-04 DIAGNOSIS — I10 PRIMARY HYPERTENSION: ICD-10-CM

## 2025-06-04 DIAGNOSIS — Z95.818 S/P MITRAL VALVE CLIP IMPLANTATION: Primary | ICD-10-CM

## 2025-06-04 DIAGNOSIS — Z98.890 S/P MITRAL VALVE CLIP IMPLANTATION: Primary | ICD-10-CM

## 2025-06-04 LAB
ALBUMIN SERPL BCP-MCNC: 3.7 G/DL (ref 3.2–4.9)
ALBUMIN/GLOB SERPL: 1.4 G/DL
ALP SERPL-CCNC: 79 U/L (ref 30–99)
ALT SERPL-CCNC: 19 U/L (ref 2–50)
ANION GAP SERPL CALC-SCNC: 11 MMOL/L (ref 7–16)
AST SERPL-CCNC: 30 U/L (ref 12–45)
BILIRUB SERPL-MCNC: 0.5 MG/DL (ref 0.1–1.5)
BUN SERPL-MCNC: 24 MG/DL (ref 8–22)
CALCIUM ALBUM COR SERPL-MCNC: 9.2 MG/DL (ref 8.5–10.5)
CALCIUM SERPL-MCNC: 9 MG/DL (ref 8.5–10.5)
CHLORIDE SERPL-SCNC: 105 MMOL/L (ref 96–112)
CO2 SERPL-SCNC: 22 MMOL/L (ref 20–33)
CREAT SERPL-MCNC: 0.93 MG/DL (ref 0.5–1.4)
EKG IMPRESSION: NORMAL
ERYTHROCYTE [DISTWIDTH] IN BLOOD BY AUTOMATED COUNT: 49.5 FL (ref 35.9–50)
GFR SERPLBLD CREATININE-BSD FMLA CKD-EPI: 61 ML/MIN/1.73 M 2
GLOBULIN SER CALC-MCNC: 2.6 G/DL (ref 1.9–3.5)
GLUCOSE SERPL-MCNC: 131 MG/DL (ref 65–99)
HCT VFR BLD AUTO: 40.1 % (ref 37–47)
HGB BLD-MCNC: 13 G/DL (ref 12–16)
MCH RBC QN AUTO: 29.1 PG (ref 27–33)
MCHC RBC AUTO-ENTMCNC: 32.4 G/DL (ref 32.2–35.5)
MCV RBC AUTO: 89.9 FL (ref 81.4–97.8)
NT-PROBNP SERPL IA-MCNC: 1575 PG/ML (ref 0–125)
PLATELET # BLD AUTO: 227 K/UL (ref 164–446)
PMV BLD AUTO: 11.6 FL (ref 9–12.9)
POTASSIUM SERPL-SCNC: 4.8 MMOL/L (ref 3.6–5.5)
PROT SERPL-MCNC: 6.3 G/DL (ref 6–8.2)
RBC # BLD AUTO: 4.46 M/UL (ref 4.2–5.4)
SODIUM SERPL-SCNC: 138 MMOL/L (ref 135–145)
WBC # BLD AUTO: 11.3 K/UL (ref 4.8–10.8)

## 2025-06-04 PROCEDURE — 93010 ELECTROCARDIOGRAM REPORT: CPT | Performed by: INTERNAL MEDICINE

## 2025-06-04 PROCEDURE — 83880 ASSAY OF NATRIURETIC PEPTIDE: CPT

## 2025-06-04 PROCEDURE — 85027 COMPLETE CBC AUTOMATED: CPT

## 2025-06-04 PROCEDURE — A9270 NON-COVERED ITEM OR SERVICE: HCPCS | Performed by: NURSE PRACTITIONER

## 2025-06-04 PROCEDURE — 93005 ELECTROCARDIOGRAM TRACING: CPT | Mod: TC | Performed by: NURSE PRACTITIONER

## 2025-06-04 PROCEDURE — 80053 COMPREHEN METABOLIC PANEL: CPT

## 2025-06-04 PROCEDURE — RXMED WILLOW AMBULATORY MEDICATION CHARGE: Performed by: NURSE PRACTITIONER

## 2025-06-04 PROCEDURE — 700102 HCHG RX REV CODE 250 W/ 637 OVERRIDE(OP): Performed by: NURSE PRACTITIONER

## 2025-06-04 PROCEDURE — 71045 X-RAY EXAM CHEST 1 VIEW: CPT

## 2025-06-04 PROCEDURE — 700111 HCHG RX REV CODE 636 W/ 250 OVERRIDE (IP): Mod: JZ | Performed by: NURSE PRACTITIONER

## 2025-06-04 RX ORDER — AMIODARONE HYDROCHLORIDE 200 MG/1
TABLET ORAL
Qty: 40 TABLET | Refills: 0 | Status: SHIPPED | OUTPATIENT
Start: 2025-06-04 | End: 2025-06-11

## 2025-06-04 RX ORDER — METOPROLOL TARTRATE 25 MG/1
12.5 TABLET, FILM COATED ORAL 2 TIMES DAILY
Qty: 50 TABLET | Refills: 3 | Status: SHIPPED | OUTPATIENT
Start: 2025-06-04

## 2025-06-04 RX ORDER — POTASSIUM CHLORIDE 750 MG/1
10 CAPSULE, EXTENDED RELEASE ORAL DAILY
Qty: 90 CAPSULE | Refills: 3 | Status: SHIPPED | OUTPATIENT
Start: 2025-06-04

## 2025-06-04 RX ORDER — FUROSEMIDE 20 MG/1
20 TABLET ORAL DAILY
Qty: 90 TABLET | Refills: 3 | Status: SHIPPED | OUTPATIENT
Start: 2025-06-04

## 2025-06-04 RX ADMIN — POTASSIUM CHLORIDE 10 MEQ: 1500 TABLET, EXTENDED RELEASE ORAL at 05:26

## 2025-06-04 RX ADMIN — AMIODARONE HYDROCHLORIDE 200 MG: 200 TABLET ORAL at 05:27

## 2025-06-04 RX ADMIN — METOPROLOL TARTRATE 25 MG: 25 TABLET, FILM COATED ORAL at 09:04

## 2025-06-04 RX ADMIN — APIXABAN 5 MG: 5 TABLET, FILM COATED ORAL at 05:25

## 2025-06-04 RX ADMIN — HYDRALAZINE HYDROCHLORIDE 10 MG: 20 INJECTION, SOLUTION INTRAMUSCULAR; INTRAVENOUS at 04:08

## 2025-06-04 RX ADMIN — LEVOTHYROXINE SODIUM 88 MCG: 0.09 TABLET ORAL at 05:28

## 2025-06-04 RX ADMIN — FUROSEMIDE 20 MG: 10 INJECTION, SOLUTION INTRAVENOUS at 05:28

## 2025-06-04 ASSESSMENT — FIBROSIS 4 INDEX: FIB4 SCORE: 2.49

## 2025-06-04 NOTE — CARE PLAN
The patient is Stable - Low risk of patient condition declining or worsening    Shift Goals  Clinical Goals: Safety, hemodynamic stability, monitor radial and femoral sites  Patient Goals: Updates  Family Goals: JACQUELINE    Progress made toward(s) clinical / shift goals:        Problem: Knowledge Deficit - Standard  Goal: Patient and family/care givers will demonstrate understanding of plan of care, disease process/condition, diagnostic tests and medications  Outcome: Progressing     Problem: Pain - Standard  Goal: Alleviation of pain or a reduction in pain to the patient’s comfort goal  Outcome: Progressing     Problem: Communication  Goal: The ability to communicate needs accurately and effectively will improve  Outcome: Progressing     Problem: Safety  Goal: Will remain free from injury  Outcome: Progressing     Problem: Pain Management  Goal: Pain level will decrease to patient's comfort goal  Outcome: Progressing       Patient is not progressing towards the following goals:

## 2025-06-04 NOTE — CARE PLAN
The patient is Watcher - Medium risk of patient condition declining or worsening         Progress made toward(s) clinical / shift goals:    Problem: Knowledge Deficit - Standard  Goal: Patient and family/care givers will demonstrate understanding of plan of care, disease process/condition, diagnostic tests and medications  6/3/2025 2035 by Tamanna Walsh R.N.  Outcome: Progressing  6/3/2025 1323 by Tamanna Walsh R.N.  Outcome: Progressing     Problem: Pain - Standard  Goal: Alleviation of pain or a reduction in pain to the patient’s comfort goal  6/3/2025 2035 by PRIYANKA BritoN.  Outcome: Progressing  6/3/2025 1323 by Tamanna Walsh R.N.  Outcome: Progressing     Problem: Communication  Goal: The ability to communicate needs accurately and effectively will improve  6/3/2025 2035 by Tamanna Walsh R.N.  Outcome: Progressing  6/3/2025 1323 by Tamanna Walsh R.N.  Outcome: Progressing     Problem: Safety  Goal: Will remain free from injury  6/3/2025 2035 by Tamanna Walsh R.N.  Outcome: Progressing  6/3/2025 1323 by PRIYANKA BritoN.  Outcome: Progressing  Goal: Will remain free from falls  6/3/2025 2035 by VICKY Brito.N.  Outcome: Progressing  6/3/2025 1323 by PRIYANKA BritoN.  Outcome: Progressing     Problem: Pain Management  Goal: Pain level will decrease to patient's comfort goal  6/3/2025 2035 by VICKY Brito.STEFANIA.  Outcome: Progressing  6/3/2025 1323 by PRIYANKA BritoN.  Outcome: Progressing     Problem: Infection  Goal: Will remain free from infection  6/3/2025 2035 by Tamanna Walsh R.N.  Outcome: Progressing  6/3/2025 1323 by PRIYANKA BritoN.  Outcome: Progressing     Problem: Venous Thromboembolism (VTW)/Deep Vein Thrombosis (DVT) Prevention:  Goal: Patient will participate in Venous Thrombosis (VTE)/Deep Vein Thrombosis (DVT)Prevention Measures  6/3/2025 2035 by Tamanna Walsh R.N.  Outcome: Progressing  6/3/2025 1323 by Tamanna Walsh R.N.  Outcome:  Progressing     Problem: Psychosocial Needs:  Goal: Level of anxiety will decrease  6/3/2025 2035 by Tamanna Walsh, R.N.  Outcome: Progressing  6/3/2025 1323 by Tamanna Walsh, R.N.  Outcome: Progressing     Problem: Fluid Volume:  Goal: Will maintain balanced intake and output  6/3/2025 2035 by Tamanna Walsh, R.N.  Outcome: Progressing  6/3/2025 1323 by Tamanna Walsh, R.N.  Outcome: Progressing     Problem: Respiratory:  Goal: Respiratory status will improve  6/3/2025 2035 by Tamanna Walsh, R.N.  Outcome: Progressing  6/3/2025 1323 by Tamanna Walsh, R.N.  Outcome: Progressing     Problem: Bowel/Gastric:  Goal: Normal bowel function is maintained or improved  6/3/2025 2035 by Tamanna Walsh, R.N.  Outcome: Progressing  6/3/2025 1323 by Tamanna Walsh, R.N.  Outcome: Progressing  Goal: Will not experience complications related to bowel motility  6/3/2025 2035 by Tamanna Walsh, R.N.  Outcome: Progressing  6/3/2025 1323 by Tamanna Walsh, R.N.  Outcome: Progressing     Problem: Urinary Elimination:  Goal: Ability to reestablish a normal urinary elimination pattern will improve  6/3/2025 2035 by Tamanna Walsh, R.N.  Outcome: Progressing  6/3/2025 1323 by Tamanna Walsh, R.N.  Outcome: Progressing     Problem: Skin Integrity  Goal: Risk for impaired skin integrity will decrease  6/3/2025 2035 by Tamanna Walsh, R.N.  Outcome: Progressing  6/3/2025 1323 by Tamanna Walsh, R.N.  Outcome: Progressing     Problem: Mobility  Goal: Risk for activity intolerance will decrease  6/3/2025 2035 by Tamanna Walsh, R.N.  Outcome: Progressing  6/3/2025 1323 by Tamanna Walsh, R.N.  Outcome: Progressing     Problem: Medication  Goal: Compliance with prescribed medication will improve  6/3/2025 2035 by Tamanna Walsh R.N.  Outcome: Progressing  6/3/2025 1323 by Tamanna Walsh R.N.  Outcome: Progressing     Problem: Knowledge Deficit  Goal: Knowledge of disease process/condition, treatment plan, diagnostic  tests, and medications will improve  6/3/2025 2035 by Tamanna Walsh R.N.  Outcome: Progressing  6/3/2025 1323 by Tamanna Walsh R.N.  Outcome: Progressing  Goal: Knowledge of the prescribed therapeutic regimen will improve  6/3/2025 2035 by Tamanna Walsh R.N.  Outcome: Progressing  6/3/2025 1323 by Tamanna Walsh R.N.  Outcome: Progressing     Problem: Discharge Barriers/Planning  Goal: Patient's continuum of care needs will be met  6/3/2025 2035 by Tamanna Walsh R.N.  Outcome: Progressing  6/3/2025 1323 by Tamanna Walsh R.N.  Outcome: Progressing       Patient is not progressing towards the following goals:

## 2025-06-04 NOTE — PROGRESS NOTES
Monitor Summary  Rhythm: SR  Rate: 64-81  Ectopy: (R) PVC/ (O) PAC  Measurements: .21/.08/.38  ---12 hr Chart Review---

## 2025-06-04 NOTE — PROGRESS NOTES
PATIENT THOUGHT PUREWICK WAS LEAKING, went to check and her groin site was bleeding, immediately laid flat, held pressure for 10 minutes then placed a sand bag on groin and laid flat for an hour, no hematoma present, sand bag removed @1700 no more bleeding, notified charge rn

## 2025-06-04 NOTE — TELEPHONE ENCOUNTER
Patient called wanting to clarify if she should be taking furosemide and potassium any longer. She states it was not on her discharge to continue. Advised to follow discharge instructions.     Discussed with Digna RICHTER. She actually would like patient to resume furosemide 20mg and potassium 10mEq daily.     Called patient back to inform. She verbalizes understanding and will resume tomorrow.

## 2025-06-04 NOTE — PROGRESS NOTES
Discharge orders received.  Patient arrived to the discharge lounge.  PIV removed.  Instructions given, medications reviewed and general discharge education provided to patient.  Follow up appointments discussed.  Patient verbalized understanding of dc instructions and prescriptions.  Patient signed discharge instructions.  Patient verbalized understanding had all belongings with her.  Patient pending meds and ride. Wished patient a speedy recovery.

## 2025-06-04 NOTE — PROGRESS NOTES
Bedside report received and patient care assumed. Pt is resting in bed, A&O4, with no complaints of pain, and is on room air . Tele box on. All fall precautions are in place, belongings at bedside table. Pt. Educated on precautions to help prevent femoral site from bleed. Femoral site clean dry and soft. Patient declines pain Pt was updated on POC, no questions or concerns. Pt educated on use of call light for assistance.

## 2025-06-04 NOTE — DISCHARGE SUMMARY
PRIMARY DISCHARGE DIAGNOSIS: Status post MitraClip X 1 NTW clip    PROCEDURES:    1. Successful ROXANNE with MitraClip X1 NTW clip , transfemoral approach under general anesthesia on 6/3/25  2. Intraoperative transesophageal echocardiogram showing Intraoperative RUDDY during mitraclip procedure.  Baseline images show   normal biventricular function with EF = 55%. Degenerative mitral valve   disease with posterior leaflet prolapse and mitral annular   calcification extending to the subvalvular apparatus.  Moderate-severe   (3-4+) mitral regurgitation. A single mitraclip was placed at the A2/P2   coaptation with mild (1+) residual MR.  Mean mitral gradient = 5 mm Hg   at a heart rate of 54 bpm in NSR.  Findings communicated at the time of   exam.   3. CXR on 6/4/25 showing no acute process  4. EKG on 6/4/25 showing SR rate of 71 bpm  5. Labs on 6/4/25 showing   Recent Labs     06/03/25  0918 06/04/25  0249   WBC 7.5 11.3*   RBC 4.28 4.46   HEMOGLOBIN 12.3 13.0   HEMATOCRIT 38.8 40.1   MCV 90.7 89.9   MCH 28.7 29.1   RDW 49.0 49.5   PLATELETCT 183 227   MPV 11.3 11.6     Recent Labs     06/03/25  0918 06/04/25  0249   SODIUM 138 138   POTASSIUM 4.2 4.8   CHLORIDE 105 105   CO2 21 22   GLUCOSE 127* 131*   BUN 29* 24*     INR   Date Value Ref Range Status   05/30/2025 1.29 (H) 0.87 - 1.13 Final     Comment:     INR - Non-therapeutic Reference Range: 0.87-1.13  INR - Therapeutic Reference Range: 2.0-4.0       HOSPITAL COURSE: The patient is a pleasant 80 year old female with severe symptomatic mitral regurgitation, PAF with successful intra-op DCCV X2 on chronic anticoagulation, HLD, HTN, former smoker, obesity with JAVIER, and hypothyroidism. Due to the patient's symptoms, the patient underwent successful MitraClip described as above. Post-procedure, the patient did well. They did require IV diuresis during their stay and will continue on oral diuretics post-operatively. She did have some post-operative asymptomatic bradycardia  post DCCV, we will adjust her home amiodarone and metoprolol. They were able to ambulate without difficulty. No further events were noted during their stay. They are now off oxygen and are to be discharged to home with her brother.    DISCHARGE MEDICATIONS:      Medication List        CHANGE how you take these medications        Instructions   amiodarone 200 MG Tabs  Start taking on: June 4, 2025  What changed: See the new instructions.  Commonly known as: Cordarone   Take 1 Tablet by mouth 2 times a day for 6 days, THEN 1 Tablet every day for 30 days.     metoprolol tartrate 25 MG Tabs  What changed: how much to take  Commonly known as: Lopressor   Take 0.5 Tablets by mouth 2 times a day. Indications: High Blood Pressure  Dose: 12.5 mg            CONTINUE taking these medications        Instructions   acetaminophen 500 MG Tabs  Commonly known as: Tylenol   Take 1,000 mg by mouth every 6 hours as needed for Moderate Pain.  Dose: 1,000 mg     Eliquis 5mg Tabs  Generic drug: apixaban   Take 5 mg by mouth 2 times a day. Indications: Atrial Fibrillation  Dose: 5 mg     famotidine 20 MG Tabs  Commonly known as: Pepcid   Take 20 mg by mouth every evening as needed (acid reflux). OTC  Dose: 20 mg     levothyroxine 88 MCG Tabs  Commonly known as: Synthroid   Take 88 mcg by mouth every day. Indications: Underactive Thyroid  Dose: 88 mcg     rosuvastatin 40 MG tablet  Commonly known as: Crestor   Take 1 Tablet by mouth every day.  Dose: 1 Tablet            DISCHARGE INSTRUCTIONS: They are given discharge instructions on potential post-operative complications and symptoms to watch out for. Their groin sites were checked and were clean, dry, and intact. Patient or family to notify us for any complications noted on the discharge instructions. They will follow up with myself, Digna Valentin DNP, APRN, on Tuesday in our cardiology office. They will not get labs before their follow up appointment. They will then follow up with a  repeat echocardiogram in one month for post MitraClip assessment.     Future Appointments   Date Time Provider Department Center   6/11/2025  2:45 PM ROB William None   7/1/2025  1:15 PM [facility] EXAM 10 ECHO [facility]   7/10/2025  1:45 PM ROB William None   6/3/2026  1:15 PM [facility] EXAM 9 ECHO [facility]     Discharge time spent with patient was 21 minutes.

## 2025-06-04 NOTE — PROGRESS NOTES
"    Interventional cardiology Initial Consultation Note      Chief Complaint: Mitral regurgitation    Debbie Joseph is a 80 y.o. female  patient referred for interventional cardiology consultation by Dr. Martinez.  She has severe symptomatic mitral regurgitation, she was evaluated by , felt high risk for surgery.  She is here for consultation regarding mitral valve clip procedure        Past Medical History[1]          Current Medications[2]          Physical Exam:  Ambulatory Vitals  /66 (BP Location: Left arm, Patient Position: Sitting, BP Cuff Size: Adult)   Pulse 80   Resp 18   Ht 1.626 m (5' 4\")   Wt 79.8 kg (176 lb)   SpO2 98%    Oxygen Therapy:     BP Readings from Last 4 Encounters:   06/04/25 121/69   05/27/25 114/66   05/14/25 122/66   02/14/25 116/78       Weight/BMI: Body mass index is 30.21 kg/m².  Wt Readings from Last 4 Encounters:   06/04/25 81.6 kg (179 lb 14.3 oz)   05/27/25 79.8 kg (176 lb)   05/14/25 78 kg (171 lb 15.3 oz)   02/04/25 73.3 kg (161 lb 8 oz)           General: Well appearing and in no apparent distress  Neck: carotid bruits absent  Lungs: respiratory sounds  normal  Heart: Regular rhythm,  No palpable thrills on palpation, murmurs present, no rubs,   Lower extremity edema absent.     Echocardiogram performed by Dr. Martinez reviewed, independently interpreted severe functional mitral regurgitation    Medical Decision Making:  Problem List Items Addressed This Visit          Cardiology Medicine Problems    Primary hypertension    Relevant Medications    amiodarone (CORDARONE) 200 MG Tab    Other hyperlipidemia    Relevant Medications    amiodarone (CORDARONE) 200 MG Tab    PAF (paroxysmal atrial fibrillation) (HCC)    Relevant Medications    amiodarone (CORDARONE) 200 MG Tab       Other     Nonrheumatic mitral (valve) insufficiency - Primary    Relevant Medications    amiodarone (CORDARONE) 200 MG Tab    Other Relevant Orders    EKG (Completed)    Former " smoker    JAVIER (obstructive sleep apnea)       Today's encounter addressed an illness with threat to life/bodily function severe mitral regurgitation NYHA class III stage C   Her anatomy is somewhat challenging due to posterior leaflet calcification, small valve for mitral valve clip procedure.  However I think we can reduce mitral valve regurgitation significantly with a clip.  Risk benefits of the procedure discussed in detail with the patient, she agrees to proceed  Plan of care discussed with cardiothoracic surgeon Dr. Calderón    Looks like she has been in A-fib for last 2 to 3 years, we will attempt cardioversion, start amiodarone.    This note was dictated using Dragon speech recognition software.    Faraz ANGLIN  Interventional cardiologist  [facility] Medicine, [facility]  [address] [address]  Phone: [phone]  Fax: [phone]                      [1]   Past Medical History:  Diagnosis Date    Arrhythmia     Cataract     Heart burn     Heart murmur     Heart valve disease     High cholesterol     Hyperlipidemia     Hypertension     JAVIER (obstructive sleep apnea)     Osteoporosis, senile     PONV (postoperative nausea and vomiting)     Thyroid disease    [2]   No current facility-administered medications for this visit.     No current outpatient medications on file.     Facility-Administered Medications Ordered in Other Visits   Medication Dose Route Frequency Provider Last Rate Last Admin    amiodarone (Cordarone) tablet 200 mg  200 mg Oral TWICE DAILY Digna Valentin, A.P.R.N.   200 mg at 06/04/25 0527    apixaban (Eliquis) tablet 5 mg  5 mg Oral BID Digna Valentin, A.P.R.N.   5 mg at 06/04/25 0525    famotidine (Pepcid) tablet 20 mg  20 mg Oral Q EVENING Digna Valentin A.P.R.N.   20 mg at 06/03/25 1854    levothyroxine (Synthroid) tablet 88 mcg  88 mcg Oral AM ES Digna Valentin A.P.R.N.    88 mcg at 06/04/25 0528    metoprolol tartrate (Lopressor) tablet 25 mg  25 mg Oral BID Digna Valentin A.P.R.N.   25 mg at 06/03/25 1854    rosuvastatin (Crestor) tablet 40 mg  40 mg Oral DAILY Digna Valentin A.P.R.N.   40 mg at 06/03/25 1255    hydrALAZINE (Apresoline) injection 10 mg  10 mg Intravenous Q30 MIN PRN Digna Valentin A.P.R.N.   10 mg at 06/04/25 0408    acetaminophen (Tylenol) tablet 650 mg  650 mg Oral Q6HRS PRN Digna Valentin A.P.R.N.   650 mg at 06/03/25 2332    diphenhydrAMINE (Benadryl) tablet/capsule 25 mg  25 mg Oral HS PRN Digna Valentin A.P.R.N.        docusate sodium (Colace) capsule 100 mg  100 mg Oral BID TOD William.P.R.N.        senna-docusate (Pericolace Or Senokot S) 8.6-50 MG per tablet 1 Tablet  1 Tablet Oral Nightly TOD William.P.R.N.        senna-docusate (Pericolace Or Senokot S) 8.6-50 MG per tablet 1 Tablet  1 Tablet Oral Q24HRS PRN Digna Valentin A.P.R.N.        polyethylene glycol/lytes (Miralax) Packet 1 Packet  1 Packet Oral BID PRN TOD William.P.R.N.        magnesium hydroxide (Milk Of Magnesia) suspension 30 mL  30 mL Oral QDAY PRN Digna Valentin A.P.R.N.        bisacodyl (Dulcolax) suppository 10 mg  10 mg Rectal Q24HRS PRN TOD William.P.R.N.        sodium phosphate enema 1 Each  1 Each Rectal Once PRN Digna Valentin A.P.R.N.        ondansetron (Zofran) syringe/vial injection 4 mg  4 mg Intravenous Q4HRS PRN Digna Valentin A.P.R.N.        diphenhydrAMINE (Benadryl) injection 25 mg  25 mg Intravenous Q6HRS PRN RACHELLE WilliamP.R.N.        furosemide (Lasix) injection 20 mg  20 mg Intravenous DAILY TOD William.P.R.N.   20 mg at 06/04/25 0528    potassium chloride SA (Kdur) tablet 10 mEq  10 mEq Oral DAILY TOD William.P.R.N.   10 mEq at 06/04/25 0569

## 2025-06-06 LAB — EKG IMPRESSION: NORMAL

## 2025-06-06 PROCEDURE — 93010 ELECTROCARDIOGRAM REPORT: CPT | Performed by: INTERNAL MEDICINE

## 2025-06-11 ENCOUNTER — OFFICE VISIT (OUTPATIENT)
Dept: CARDIOLOGY | Facility: MEDICAL CENTER | Age: 80
End: 2025-06-11
Attending: NURSE PRACTITIONER
Payer: MEDICARE

## 2025-06-11 VITALS
RESPIRATION RATE: 18 BRPM | BODY MASS INDEX: 30.9 KG/M2 | HEART RATE: 81 BPM | WEIGHT: 181 LBS | OXYGEN SATURATION: 93 % | SYSTOLIC BLOOD PRESSURE: 110 MMHG | HEIGHT: 64 IN | DIASTOLIC BLOOD PRESSURE: 60 MMHG

## 2025-06-11 DIAGNOSIS — E78.49 OTHER HYPERLIPIDEMIA: ICD-10-CM

## 2025-06-11 DIAGNOSIS — I10 PRIMARY HYPERTENSION: ICD-10-CM

## 2025-06-11 DIAGNOSIS — I48.0 PAF (PAROXYSMAL ATRIAL FIBRILLATION) (HCC): ICD-10-CM

## 2025-06-11 DIAGNOSIS — I89.0 LYMPHEDEMA: ICD-10-CM

## 2025-06-11 DIAGNOSIS — Z98.890 S/P MITRAL VALVE CLIP IMPLANTATION: Primary | ICD-10-CM

## 2025-06-11 DIAGNOSIS — Z87.891 FORMER SMOKER: ICD-10-CM

## 2025-06-11 DIAGNOSIS — G47.33 OSA (OBSTRUCTIVE SLEEP APNEA): ICD-10-CM

## 2025-06-11 DIAGNOSIS — Z95.818 S/P MITRAL VALVE CLIP IMPLANTATION: Primary | ICD-10-CM

## 2025-06-11 LAB — EKG IMPRESSION: NORMAL

## 2025-06-11 PROCEDURE — 93005 ELECTROCARDIOGRAM TRACING: CPT | Mod: TC | Performed by: NURSE PRACTITIONER

## 2025-06-11 PROCEDURE — 3078F DIAST BP <80 MM HG: CPT | Performed by: NURSE PRACTITIONER

## 2025-06-11 PROCEDURE — 99213 OFFICE O/P EST LOW 20 MIN: CPT | Performed by: NURSE PRACTITIONER

## 2025-06-11 PROCEDURE — 93010 ELECTROCARDIOGRAM REPORT: CPT | Performed by: INTERNAL MEDICINE

## 2025-06-11 PROCEDURE — 3074F SYST BP LT 130 MM HG: CPT | Performed by: NURSE PRACTITIONER

## 2025-06-11 PROCEDURE — 99214 OFFICE O/P EST MOD 30 MIN: CPT | Performed by: NURSE PRACTITIONER

## 2025-06-11 RX ORDER — AMIODARONE HYDROCHLORIDE 200 MG/1
200 TABLET ORAL DAILY
Qty: 90 TABLET | Refills: 3 | Status: SHIPPED | OUTPATIENT
Start: 2025-06-11

## 2025-06-11 ASSESSMENT — ENCOUNTER SYMPTOMS
PALPITATIONS: 0
ORTHOPNEA: 0
COUGH: 0
PND: 0
FEVER: 0
MYALGIAS: 0
ABDOMINAL PAIN: 0
DIZZINESS: 0
SHORTNESS OF BREATH: 0
CLAUDICATION: 0

## 2025-06-11 ASSESSMENT — FIBROSIS 4 INDEX: FIB4 SCORE: 2.49

## 2025-06-11 NOTE — PROGRESS NOTES
Chief Complaint   Patient presents with    Follow-Up     1 week post Mitral     Subjective     Debbie Joseph is a 80 y.o. female who presents today for 1 week S/P ROXANNE (Bing Clip).    Hx of severe symptomatic mitral regurgitation, PAF with successful intra-op DCCV X2 on chronic anticoagulation, HLD, HTN, former smoker, obesity with JAVIER, and hypothyroidism.    She presents today alone. She has chronic leg swelling with non-pitting appearance, presumed to be lymphedema. Low BP today but not usually at home.    She can't tolerate compression stockings up to her knees.    She remains in sinus rhythm post intra-op DCCV.    He has no chest pain, shortness of breath, dizziness/lightheadedness, or palpitations.      Past Medical History[1]  Past Surgical History[2]  Family History   Problem Relation Age of Onset    Heart Disease Mother         Bypass, carotid surgery    Heart Disease Father         Heart attacks, debilitating stroke,  of stroke    Heart Disease Brother         TransValve    Stroke Maternal Grandmother     Stroke Paternal Grandmother          of strokes in mid 30's     Social History     Socioeconomic History    Marital status: Single     Spouse name: Not on file    Number of children: Not on file    Years of education: Not on file    Highest education level: Not on file   Occupational History    Not on file   Tobacco Use    Smoking status: Former     Current packs/day: 0.00     Average packs/day: 1.5 packs/day for 9.0 years (13.5 ttl pk-yrs)     Types: Cigarettes     Start date: 1967     Quit date: 1977     Years since quittin.4    Smokeless tobacco: Never   Vaping Use    Vaping status: Never Used   Substance and Sexual Activity    Alcohol use: No    Drug use: Never    Sexual activity: Not on file   Other Topics Concern    Not on file   Social History Narrative    Not on file     Social Drivers of Health     Financial Resource Strain: Not on file   Food Insecurity: No Food Insecurity  "(6/3/2025)    Hunger Vital Sign     Worried About Running Out of Food in the Last Year: Never true     Ran Out of Food in the Last Year: Never true   Transportation Needs: No Transportation Needs (6/3/2025)    PRAPARE - Transportation     Lack of Transportation (Medical): No     Lack of Transportation (Non-Medical): No   Physical Activity: Not on file   Stress: Not on file   Social Connections: Feeling Socially Integrated (2/14/2025)    OASIS : Social Isolation     Frequency of experiencing loneliness or isolation: Never   Intimate Partner Violence: Not on file   Housing Stability: Unknown (6/3/2025)    Housing Stability Vital Sign     Unable to Pay for Housing in the Last Year: No     Number of Times Moved in the Last Year: Not on file     Homeless in the Last Year: No     Allergies[3]  Encounter Medications[4]  Review of Systems   Constitutional:  Negative for fever and malaise/fatigue.   Respiratory:  Negative for cough and shortness of breath.    Cardiovascular:  Positive for leg swelling. Negative for chest pain, palpitations, orthopnea, claudication and PND.   Gastrointestinal:  Negative for abdominal pain.   Musculoskeletal:  Negative for myalgias.   Neurological:  Negative for dizziness.              Objective     /60 (BP Location: Left arm, Patient Position: Sitting, BP Cuff Size: Adult)   Pulse 81   Resp 18   Ht 1.626 m (5' 4\")   Wt 82.1 kg (181 lb)   SpO2 93%   BMI 31.07 kg/m²     Physical Exam  Vitals and nursing note reviewed.   Constitutional:       Appearance: Normal appearance. She is well-developed. She is obese.   HENT:      Head: Normocephalic and atraumatic.   Neck:      Vascular: No JVD.   Cardiovascular:      Rate and Rhythm: Normal rate and regular rhythm.      Pulses: Normal pulses.      Heart sounds: Normal heart sounds.   Pulmonary:      Effort: Pulmonary effort is normal.      Breath sounds: Normal breath sounds.   Musculoskeletal:         General: Normal range of motion. "      Right lower leg: Edema present.      Left lower leg: Edema present.      Comments: Chronic leg swelling, more lymphedema in appearance   Skin:     General: Skin is warm and dry.      Capillary Refill: Capillary refill takes less than 2 seconds.   Neurological:      General: No focal deficit present.      Mental Status: She is alert and oriented to person, place, and time. Mental status is at baseline.   Psychiatric:         Mood and Affect: Mood normal.         Behavior: Behavior normal.         Thought Content: Thought content normal.         Judgment: Judgment normal.                Assessment & Plan     1. S/P mitral valve clip implantation  EKG      2. Former smoker        3. JAVIER (obstructive sleep apnea)        4. Other hyperlipidemia        5. PAF (paroxysmal atrial fibrillation) (HCC)  amiodarone (CORDARONE) 200 MG Tab      6. Primary hypertension          Medical Decision Making: Today's Assessment/Status/Plan:      1. S/P ROXANNE, NYHA I-II  -doing well post-op  -cont eliquis in lieu of aspirin  -groin CDI with mild bruising and small nodule, glue removed  -SBE prophylaxis understands lifelong  -echo 1 month with structural heart follow up  -reviewed hospital imaging, labs, and EKG  -cardiac rehab referral placed  -EKG shows SR    2. PAF  -SR, good rate control  -REDUCE amiodarone at 200 mg once daily  -cont eliquis, tolerating well  -cont metoprolol at 12.5 mg BID    3. HTN  -low today, normal at home  -cont furosemide and metoprolol  -follow, call for concerns  -OK to move lasix/K to PRN dosing if wanted    4. Obesity with JAVIER (no CPAP), chronic leg swelling, HLD  -work on diet/exercise  -lymphedema referral placed  -HLD management with statin with primary cardiologist    Patient is to follow up with Dgina RICHTER in 1 month with echo and EKG.                               [1]   Past Medical History:  Diagnosis Date    Arrhythmia     Cataract     Heart burn     Heart murmur     Heart valve  disease     High cholesterol     Hyperlipidemia     Hypertension     JAVIER (obstructive sleep apnea)     Osteoporosis, senile     PONV (postoperative nausea and vomiting)     Thyroid disease    [2]   Past Surgical History:  Procedure Laterality Date    ECHOCARDIOGRAM, TRANSESOPHAGEAL, INTRAOPERATIVE N/A 6/3/2025    Procedure: ECHOCARDIOGRAM, TRANSESOPHAGEAL, INTRAOPERATIVE;  Surgeon: Faraz Shore M.D.;  Location: SURGERY [facility];  Service: Cardiac    TRANSCATHETER MITRAL VALVE REPAIR Right 6/3/2025    Procedure: PROCEDURE, MITRAL VALVE, TRANSCATHETER;  Surgeon: Faraz Shore M.D.;  Location: SURGERY [facility];  Service: Cardiac    ANGIOGRAM Left 2016    w/stent placement    CATARACT EXTRACTION WITH IOL Bilateral 04/2014    ABDOMINAL HYSTERECTOMY TOTAL      ORIF, WRIST Left    [3]   Allergies  Allergen Reactions    Ace Inhibitors Unspecified     Pt states causes coughing    Tetanus Antitoxin Rash     Historic childhood reporting   [4]   Outpatient Encounter Medications as of 6/11/2025   Medication Sig Dispense Refill    amiodarone (CORDARONE) 200 MG Tab Take 1 Tablet by mouth every day. 90 Tablet 3    metoprolol tartrate (LOPRESSOR) 25 MG Tab Take 0.5 Tablets by mouth 2 times a day. Indications: High Blood Pressure 50 Tablet 3    furosemide (LASIX) 20 MG Tab Take 1 Tablet by mouth every day. 90 Tablet 3    potassium chloride (MICRO-K) 10 MEQ capsule Take 1 Capsule by mouth every day. 90 Capsule 3    famotidine (PEPCID) 20 MG Tab Take 20 mg by mouth every evening as needed (acid reflux). OTC      acetaminophen (TYLENOL) 500 MG Tab Take 1,000 mg by mouth every 6 hours as needed for Moderate Pain.      rosuvastatin (CRESTOR) 40 MG tablet Take 1 Tablet by mouth every day.      apixaban (ELIQUIS) 5mg Tab Take 5 mg by mouth 2 times a day. Indications: Atrial Fibrillation      levothyroxine (SYNTHROID) 88 MCG Tab Take 88 mcg by mouth every day. Indications: Underactive Thyroid      [DISCONTINUED]  amiodarone (CORDARONE) 200 MG Tab Take 1 Tablet by mouth 2 times a day for 6 days, THEN 1 Tablet every day for 30 days. (Patient taking differently: One time daily      Take 1 Tablet by mouth 2 times a day for 6 days, THEN 1 Tablet every day for 30 days.) 40 Tablet 0     No facility-administered encounter medications on file as of 6/11/2025.

## 2025-06-11 NOTE — LETTER
[facility]   [address], [address]  [place] [address]  Phone: [phone]  Fax: [phone]              Patient:      Debbie Joseph  :         1945          On behalf of [facility], we would like to thank you for allowing us to participate in the care of your patient.     She underwent a successful mitral valve transcatheter [id number] repair (Mitral ROXANNE) on Tuesday, Tasia 3, 2025.     Your patient is scheduled to follow up with our Structural Heart Program at one month and one year post procedure.     Again, thank you for allowing us to participate in the care of your patient. If you have any questions, please do not hesitate to contact our Structural Heart team.     Sincerely,    [facility]    STARR Monzno, RN, Structural Heart Program Nurse Coordinator ([phone])  STARR Duron, RN, Structural Heart Program Nurse Coordinator ([phone])                                     Encounter Date: 2025    MANJIT William.          PROGRESS NOTE:  Chief Complaint   Patient presents with    Follow-Up     1 week post Mitral     Subjective    Debbie Joseph is a 80 y.o. female who presents today for 1 week S/P ROXANNE (Bing Clip).    Hx of severe symptomatic mitral regurgitation, PAF with successful intra-op DCCV X2 on chronic anticoagulation, HLD, HTN, former smoker, obesity with JAVIER, and hypothyroidism.    She presents today alone. She has chronic leg swelling with non-pitting appearance, presumed to be lymphedema. Low BP today but not usually at home.    She can't tolerate compression stockings up to her knees.    She remains in sinus rhythm post intra-op DCCV.    He has no chest pain, shortness of breath, dizziness/lightheadedness, or palpitations.      Past Medical History[1]  Past Surgical History[2]  Family History   Problem Relation Age of Onset    Heart Disease Mother         Bypass,  carotid surgery    Heart Disease Father         Heart attacks, debilitating stroke,  of stroke    Heart Disease Brother         TransValve    Stroke Maternal Grandmother     Stroke Paternal Grandmother          of strokes in mid 30's     Social History     Socioeconomic History    Marital status: Single     Spouse name: Not on file    Number of children: Not on file    Years of education: Not on file    Highest education level: Not on file   Occupational History    Not on file   Tobacco Use    Smoking status: Former     Current packs/day: 0.00     Average packs/day: 1.5 packs/day for 9.0 years (13.5 ttl pk-yrs)     Types: Cigarettes     Start date: 1967     Quit date: 1977     Years since quittin.4    Smokeless tobacco: Never   Vaping Use    Vaping status: Never Used   Substance and Sexual Activity    Alcohol use: No    Drug use: Never    Sexual activity: Not on file   Other Topics Concern    Not on file   Social History Narrative    Not on file     Social Drivers of Health     Financial Resource Strain: Not on file   Food Insecurity: No Food Insecurity (6/3/2025)    Hunger Vital Sign     Worried About Running Out of Food in the Last Year: Never true     Ran Out of Food in the Last Year: Never true   Transportation Needs: No Transportation Needs (6/3/2025)    PRAPARE - Transportation     Lack of Transportation (Medical): No     Lack of Transportation (Non-Medical): No   Physical Activity: Not on file   Stress: Not on file   Social Connections: Feeling Socially Integrated (2025)    OASIS : Social Isolation     Frequency of experiencing loneliness or isolation: Never   Intimate Partner Violence: Not on file   Housing Stability: Unknown (6/3/2025)    Housing Stability Vital Sign     Unable to Pay for Housing in the Last Year: No     Number of Times Moved in the Last Year: Not on file     Homeless in the Last Year: No     Allergies[3]  Encounter Medications[4]  Review of Systems  "  Constitutional:  Negative for fever and malaise/fatigue.   Respiratory:  Negative for cough and shortness of breath.    Cardiovascular:  Positive for leg swelling. Negative for chest pain, palpitations, orthopnea, claudication and PND.   Gastrointestinal:  Negative for abdominal pain.   Musculoskeletal:  Negative for myalgias.   Neurological:  Negative for dizziness.              Objective    /60 (BP Location: Left arm, Patient Position: Sitting, BP Cuff Size: Adult)   Pulse 81   Resp 18   Ht 1.626 m (5' 4\")   Wt 82.1 kg (181 lb)   SpO2 93%   BMI 31.07 kg/m²     Physical Exam  Vitals and nursing note reviewed.   Constitutional:       Appearance: Normal appearance. She is well-developed. She is obese.   HENT:      Head: Normocephalic and atraumatic.   Neck:      Vascular: No JVD.   Cardiovascular:      Rate and Rhythm: Normal rate and regular rhythm.      Pulses: Normal pulses.      Heart sounds: Normal heart sounds.   Pulmonary:      Effort: Pulmonary effort is normal.      Breath sounds: Normal breath sounds.   Musculoskeletal:         General: Normal range of motion.      Right lower leg: Edema present.      Left lower leg: Edema present.      Comments: Chronic leg swelling, more lymphedema in appearance   Skin:     General: Skin is warm and dry.      Capillary Refill: Capillary refill takes less than 2 seconds.   Neurological:      General: No focal deficit present.      Mental Status: She is alert and oriented to person, place, and time. Mental status is at baseline.   Psychiatric:         Mood and Affect: Mood normal.         Behavior: Behavior normal.         Thought Content: Thought content normal.         Judgment: Judgment normal.                Assessment & Plan    1. S/P mitral valve clip implantation  EKG      2. Former smoker        3. JAVIER (obstructive sleep apnea)        4. Other hyperlipidemia        5. PAF (paroxysmal atrial fibrillation) (HCC)  amiodarone (CORDARONE) 200 MG Tab      6. " Primary hypertension          Medical Decision Making: Today's Assessment/Status/Plan:      1. S/P ROXANNE, NYHA I-II  -doing well post-op  -cont eliquis in lieu of aspirin  -groin CDI with mild bruising and small nodule, glue removed  -SBE prophylaxis understands lifelong  -echo 1 month with structural heart follow up  -reviewed hospital imaging, labs, and EKG  -cardiac rehab referral placed  -EKG shows SR    2. PAF  -SR, good rate control  -REDUCE amiodarone at 200 mg once daily  -cont eliquis, tolerating well  -cont metoprolol at 12.5 mg BID    3. HTN  -low today, normal at home  -cont furosemide and metoprolol  -follow, call for concerns  -OK to move lasix/K to PRN dosing if wanted    4. Obesity with JAVIER (no CPAP), chronic leg swelling, HLD  -work on diet/exercise  -lymphedema referral placed  -HLD management with statin with primary cardiologist    Patient is to follow up with Digna RICHTER in 1 month with echo and EKG.                               [1]   Past Medical History:  Diagnosis Date    Arrhythmia     Cataract     Heart burn     Heart murmur     Heart valve disease     High cholesterol     Hyperlipidemia     Hypertension     JAVIER (obstructive sleep apnea)     Osteoporosis, senile     PONV (postoperative nausea and vomiting)     Thyroid disease    [2]   Past Surgical History:  Procedure Laterality Date    ECHOCARDIOGRAM, TRANSESOPHAGEAL, INTRAOPERATIVE N/A 6/3/2025    Procedure: ECHOCARDIOGRAM, TRANSESOPHAGEAL, INTRAOPERATIVE;  Surgeon: Faraz Shore M.D.;  Location: SURGERY [facility];  Service: Cardiac    TRANSCATHETER MITRAL VALVE REPAIR Right 6/3/2025    Procedure: PROCEDURE, MITRAL VALVE, TRANSCATHETER;  Surgeon: Faraz Shore M.D.;  Location: SURGERY [facility];  Service: Cardiac    ANGIOGRAM Left 2016    w/stent placement    CATARACT EXTRACTION WITH IOL Bilateral 04/2014    ABDOMINAL HYSTERECTOMY TOTAL      ORIF, WRIST Left    [3]   Allergies  Allergen Reactions    Ace  Inhibitors Unspecified     Pt states causes coughing    Tetanus Antitoxin Rash     Historic childhood reporting   [4]   Outpatient Encounter Medications as of 6/11/2025   Medication Sig Dispense Refill    amiodarone (CORDARONE) 200 MG Tab Take 1 Tablet by mouth every day. 90 Tablet 3    metoprolol tartrate (LOPRESSOR) 25 MG Tab Take 0.5 Tablets by mouth 2 times a day. Indications: High Blood Pressure 50 Tablet 3    furosemide (LASIX) 20 MG Tab Take 1 Tablet by mouth every day. 90 Tablet 3    potassium chloride (MICRO-K) 10 MEQ capsule Take 1 Capsule by mouth every day. 90 Capsule 3    famotidine (PEPCID) 20 MG Tab Take 20 mg by mouth every evening as needed (acid reflux). OTC      acetaminophen (TYLENOL) 500 MG Tab Take 1,000 mg by mouth every 6 hours as needed for Moderate Pain.      rosuvastatin (CRESTOR) 40 MG tablet Take 1 Tablet by mouth every day.      apixaban (ELIQUIS) 5mg Tab Take 5 mg by mouth 2 times a day. Indications: Atrial Fibrillation      levothyroxine (SYNTHROID) 88 MCG Tab Take 88 mcg by mouth every day. Indications: Underactive Thyroid      [DISCONTINUED] amiodarone (CORDARONE) 200 MG Tab Take 1 Tablet by mouth 2 times a day for 6 days, THEN 1 Tablet every day for 30 days. (Patient taking differently: One time daily      Take 1 Tablet by mouth 2 times a day for 6 days, THEN 1 Tablet every day for 30 days.) 40 Tablet 0     No facility-administered encounter medications on file as of 6/11/2025.         Nathalie Abraham D.O.  [address]  [facility] [address]  [facility] [address]  Via Fax: [phone]    Genia Martinez M.D.  [address] [place] [address]  Via Fax: [phone]

## 2025-06-11 NOTE — PATIENT INSTRUCTIONS
Continue amiodarone 200 mg once daily. Watch BP and symptoms.    Lymphedema treatment with PT to help with leg compression.

## 2025-06-12 ENCOUNTER — DOCUMENTATION (OUTPATIENT)
Dept: CARDIOLOGY | Facility: MEDICAL CENTER | Age: 80
End: 2025-06-12
Payer: MEDICARE

## 2025-06-12 NOTE — Clinical Note
REFERRAL APPROVAL NOTICE         Sent on June 12, 2025                   Debbie Joseph  [address] [facility] [place] [address]                   Dear Ms. Joseph,    After a careful review of the medical information and benefit coverage, [facility] has processed your referral. See below for additional details.    If applicable, you must be actively enrolled with your insurance for coverage of the authorized service. If you have any questions regarding your coverage, please contact your insurance directly.    REFERRAL INFORMATION   Referral #:  [id number]  Referred-To Department    Referred-By Provider:  Cardiac Intensive Care    Faraz Shore M.D.   Intensive Card Rehab      [address]  [address]  [facility] [address]  [phone] [address]  [address]  [facility] [address] [phone]    Referral Start Date:  06/03/2025  Referral End Date:   06/03/2026             SCHEDULING  If you do not already have an appointment, please call [phone] to make an appointment.     MORE INFORMATION  If you do not already have a [email or web address] account, sign up at: [email or web address]  You can access your medical information, make appointments, see lab results, billing information, and more.  If you have questions regarding this referral, please contact  the [facility] Referrals department at:             [phone]. Monday - Friday 8:00AM - 5:00PM.     Sincerely,    [facility]

## 2025-06-12 NOTE — Clinical Note
REFERRAL APPROVAL NOTICE         Sent on June 12, 2025                   Debbie Joseph  [address] [facility] [place] [address]                   Dear Ms. Joseph,    After a careful review of the medical information and benefit coverage, [facility] has processed your referral. See below for additional details.    If applicable, you must be actively enrolled with your insurance for coverage of the authorized service. If you have any questions regarding your coverage, please contact your insurance directly.    REFERRAL INFORMATION   Referral #:  [id number]  Referred-To Department    Referred-By Provider:  Physical Therapy    ROB William   [facility] [address]      [address]  [address]  [facility] [address]  [phone] [address]  [address]  [place] [address] [phone]    Referral Start Date:  06/11/2025  Referral End Date:   06/11/2026             SCHEDULING  If you do not already have an appointment, please call [phone] to make an appointment.     MORE INFORMATION  If you do not already have a [employer] account, sign up at: [email or web address]  You can access your medical information, make appointments, see lab results, billing information, and more.  If you have questions regarding this referral, please contact  the [facility] Referrals department at:             [phone]. Monday - Friday 8:00AM - 5:00PM.     Sincerely,    [facility]

## 2025-06-12 NOTE — PROGRESS NOTES
Post Mitral ROXANNE notification letter generated and electronically faxed to PCP Dr. Nathalie Abraham and primary cardiologist Dr. Genia Martinez.

## (undated) DEVICE — SET BIFURCATED BLOOD - (48EA/CS)

## (undated) DEVICE — TOWELS CLOTH SURGICAL - (4/PK 20PK/CA)

## (undated) DEVICE — GLIDESHEATH SLENDER NITINOL KIT .021 GW 6FR 10CM SINGLE WALL

## (undated) DEVICE — CATHETER PIGTAIL 6FR 145 (5EA/BX)

## (undated) DEVICE — TRANSDUCER BIFURCATED MONITORING KIT (10EA/CA)

## (undated) DEVICE — SYRINGE NON SAFETY 10 CC 21 GA X 1-1/2 IN (100/BX 4BX/CA)

## (undated) DEVICE — CATHETER THERMALDILUTION SWAN - (5EA/CA)

## (undated) DEVICE — INTRODUCER SHEATH 6FR 2.5CM - DILATOR PROTRUDING (10/BX)

## (undated) DEVICE — PACK TAVR (3EA/CA)

## (undated) DEVICE — IV TUBING HI-FLO RATE W/CLAMP (50/CA)

## (undated) DEVICE — SODIUM CHL. INJ. 0.9% 500ML (24EA/CA 50CA/PF)

## (undated) DEVICE — SHEATH DESTINATION WITH DILATOR 6FR 45CM

## (undated) DEVICE — DECANTER FLD BLS - (50/CA)

## (undated) DEVICE — SET EXTENSION WITH 2 PORTS (48EA/CA) ***PART #2C8610 IS A SUBSTITUTE*****

## (undated) DEVICE — CRIMPER CATHETER EDWARDS DISPOSABLE (1EA)

## (undated) DEVICE — GLOVESZ 8.5 BIOGEL PI MICRO - PF LF (50PR/BX)

## (undated) DEVICE — CABLE TEMPORARY PACING

## (undated) DEVICE — WIRE GUIDE LUNDQST.035X180 - TSMG-35-180-4-LES ORDER BY BOX (5EA/BX)

## (undated) DEVICE — KIT RETROFIT PROBE COVERS (24EA/BX)

## (undated) DEVICE — DRAPE CLEAR W/ELASTIC BAND RAD CARM 40 X40" (20EA/CA)"

## (undated) DEVICE — SENSOR OXIMETER ADULT SPO2 RD SET (20EA/BX)

## (undated) DEVICE — SUTURE 0 ETHIBOND CT-1 30 IN (36PK/BX)

## (undated) DEVICE — SHEATH RO 6F 25CM (10EA/BX)

## (undated) DEVICE — STOPCOCK IV 400 PSI 3W ROT (50EA/BX)

## (undated) DEVICE — LACTATED RINGERS INJ 1000 ML - (14EA/CA 60CA/PF)

## (undated) DEVICE — INTRODUCER CATHETER DILATOR PROTRUDING 8FR 2.5CM (10EA/BX)

## (undated) DEVICE — D-5-W INJ. 500 ML - (24EA/CA)

## (undated) DEVICE — ELECTRODE DUAL RETURN W/ CORD - (50/PK)

## (undated) DEVICE — SYS DLV COST CLS RM TEMP - INJECTATE (CO-SET II) (10EA/CA)

## (undated) DEVICE — MICRODRIP PRIMARY VENTED 60 (48EA/CA) THIS WAS PART #2C8428 WHICH WAS DISCONTINUED

## (undated) DEVICE — SUCTION INSTRUMENT YANKAUER BULBOUS TIP W/O VENT (50EA/CA)

## (undated) DEVICE — SYR ANGIO CNRST INJ HI-PRS 3W 65 - (10EA/CA)"

## (undated) DEVICE — WIRE GUIDE AES .035 260CM WITH 3MM J TIP"

## (undated) DEVICE — DEVICE INFLATION ATRION NOVALFEX TRANSFEMORAL SYSTEM (1EA)

## (undated) DEVICE — Device

## (undated) DEVICE — TUBING CLEARLINK DUO-VENT - C-FLO (48EA/CA)

## (undated) DEVICE — CATHETER 6FR AL1 100CM (5/BX)

## (undated) DEVICE — ELECTRODE RADIOLUCNT SOLID GEL DEFIB PADS (12EA/CA)

## (undated) DEVICE — BLADE SURGICAL #11 - (50/BX)

## (undated) DEVICE — COVER LIGHT HANDLE ALC PLUS DISP (18EA/BX)

## (undated) DEVICE — COVER CAMERA LENS LIGHT HANDLE STUBBY DISPOSABLE (20EA/BX)

## (undated) DEVICE — CHLORAPREP 26 ML APPLICATOR - ORANGE TINT(25/CA)

## (undated) DEVICE — SUTURE DEVICE CLOSURE REPAIR SYSTEM PERCLOSE PROSTYLE (10EA/PK)

## (undated) DEVICE — GUIDEWIRE STARTER STRAIGHT FIXED CORE .035 150CM 4 STRAIGHT PTFE/HEPARIN COATED (10/BX)

## (undated) DEVICE — SYRINGE 20 ML LL (50EA/BX 4BX/CA)

## (undated) DEVICE — STOPCOCK MALE 4-WAY - (50/CA)

## (undated) DEVICE — KIT INTROPERCUTANEOUS SHEATH - 8.5 FR W/MAX BARRIER AND BIOPATCH (5/CA)

## (undated) DEVICE — KIT RADIAL ARTERY 20GA W/MAX BARRIER AND BIOPATCH (5EA/CA) #10740 IS FOR THE SET RADIAL ARTERIAL

## (undated) DEVICE — BAG RESUSCITATION DISPOSABLE - WITH MASK (10 EA/CA)

## (undated) DEVICE — COVER FOOT UNIVERSAL DISP. - (25EA/CA)

## (undated) DEVICE — SET FLUID WARMING STANDARD FLOW - (10/CA)

## (undated) DEVICE — CANISTER SUCTION 3000ML MECHANICAL FILTER AUTO SHUTOFF MEDI-VAC NONSTERILE LF DISP (40EA/CA)

## (undated) DEVICE — SOD. CHL. INJ. 0.9% 1000 ML - (14EA/CA 60CA/PF)

## (undated) DEVICE — DRAPE MAYO STAND - (30/CA)